# Patient Record
Sex: MALE | Race: WHITE | NOT HISPANIC OR LATINO | Employment: OTHER | URBAN - METROPOLITAN AREA
[De-identification: names, ages, dates, MRNs, and addresses within clinical notes are randomized per-mention and may not be internally consistent; named-entity substitution may affect disease eponyms.]

---

## 2023-03-23 PROBLEM — I10 HYPERTENSION: Status: ACTIVE | Noted: 2023-03-23

## 2023-03-23 PROBLEM — I49.3 PVC (PREMATURE VENTRICULAR CONTRACTION): Status: ACTIVE | Noted: 2023-03-23

## 2023-03-23 PROBLEM — E53.8 FOLIC ACID DEFICIENCY: Status: ACTIVE | Noted: 2023-03-23

## 2023-03-23 PROBLEM — I49.8 SINUS ARRHYTHMIA: Status: ACTIVE | Noted: 2023-03-23

## 2023-03-23 PROBLEM — R22.9 LUMPS ON THE SKIN: Status: ACTIVE | Noted: 2023-03-23

## 2023-03-23 PROBLEM — N32.81 OVERACTIVE BLADDER: Status: ACTIVE | Noted: 2023-03-23

## 2023-03-23 PROBLEM — H90.3 BILATERAL SENSORINEURAL HEARING LOSS: Status: ACTIVE | Noted: 2023-03-23

## 2023-03-23 PROBLEM — I45.2 BIFASCICULAR BLOCK: Status: ACTIVE | Noted: 2023-03-23

## 2023-03-23 PROBLEM — F03.90 DEMENTIA (MULTI): Status: ACTIVE | Noted: 2023-03-23

## 2023-03-23 PROBLEM — H93.13 BILATERAL TINNITUS: Status: ACTIVE | Noted: 2023-03-23

## 2023-03-23 PROBLEM — L57.0 ACTINIC KERATOSES: Status: ACTIVE | Noted: 2023-03-23

## 2023-03-23 PROBLEM — K40.90 LEFT INGUINAL HERNIA: Status: ACTIVE | Noted: 2023-03-23

## 2023-03-23 PROBLEM — K41.90 FEMORAL HERNIA: Status: ACTIVE | Noted: 2023-03-23

## 2023-03-23 PROBLEM — R35.0 FREQUENCY OF URINATION: Status: ACTIVE | Noted: 2023-03-23

## 2023-03-23 PROBLEM — H61.22 EXCESSIVE CERUMEN IN LEFT EAR CANAL: Status: ACTIVE | Noted: 2023-03-23

## 2023-03-23 PROBLEM — C44.222 SQUAMOUS CELL CARCINOMA OF SKIN OF RIGHT EAR: Status: ACTIVE | Noted: 2023-03-23

## 2023-03-23 PROBLEM — E78.5 HYPERLIPIDEMIA: Status: ACTIVE | Noted: 2023-03-23

## 2023-03-23 PROBLEM — B35.3 TINEA PEDIS: Status: ACTIVE | Noted: 2023-03-23

## 2023-03-23 PROBLEM — R09.89 PULMONARY HYPERINFLATION: Status: ACTIVE | Noted: 2023-03-23

## 2023-03-23 PROBLEM — K40.90 RIGHT INGUINAL HERNIA: Status: ACTIVE | Noted: 2023-03-23

## 2023-03-23 PROBLEM — K59.00 CONSTIPATION: Status: ACTIVE | Noted: 2023-03-23

## 2023-03-23 RX ORDER — OXYBUTYNIN CHLORIDE 5 MG/1
1 TABLET ORAL DAILY
COMMUNITY
Start: 2022-07-06 | End: 2023-04-14 | Stop reason: ALTCHOICE

## 2023-03-23 RX ORDER — LOVASTATIN 10 MG/1
1 TABLET ORAL NIGHTLY
COMMUNITY
Start: 2014-04-29 | End: 2023-07-19 | Stop reason: SDUPTHER

## 2023-03-23 RX ORDER — UBIDECARENONE 75 MG
1 CAPSULE ORAL DAILY
COMMUNITY
Start: 2022-04-29

## 2023-03-23 RX ORDER — MELATONIN 10 MG
TABLET, SUBLINGUAL SUBLINGUAL
COMMUNITY
End: 2023-04-14 | Stop reason: ALTCHOICE

## 2023-03-23 RX ORDER — METOPROLOL SUCCINATE 25 MG/1
1 TABLET, EXTENDED RELEASE ORAL DAILY
COMMUNITY
Start: 2017-10-03 | End: 2023-04-14 | Stop reason: SDUPTHER

## 2023-03-23 RX ORDER — AMLODIPINE BESYLATE 5 MG/1
TABLET ORAL
COMMUNITY
Start: 2022-01-03 | End: 2023-12-21 | Stop reason: SDUPTHER

## 2023-03-23 RX ORDER — AMOXICILLIN AND CLAVULANATE POTASSIUM 875; 125 MG/1; MG/1
1 TABLET, FILM COATED ORAL EVERY 12 HOURS
COMMUNITY
Start: 2022-08-25 | End: 2023-04-14 | Stop reason: ALTCHOICE

## 2023-04-14 ENCOUNTER — OFFICE VISIT (OUTPATIENT)
Dept: PRIMARY CARE | Facility: CLINIC | Age: 88
End: 2023-04-14
Payer: MEDICARE

## 2023-04-14 VITALS
OXYGEN SATURATION: 98 % | SYSTOLIC BLOOD PRESSURE: 130 MMHG | WEIGHT: 174 LBS | HEART RATE: 65 BPM | BODY MASS INDEX: 26.46 KG/M2 | DIASTOLIC BLOOD PRESSURE: 70 MMHG

## 2023-04-14 DIAGNOSIS — Z12.5 PROSTATE CANCER SCREENING: ICD-10-CM

## 2023-04-14 DIAGNOSIS — E78.5 HYPERLIPIDEMIA, UNSPECIFIED HYPERLIPIDEMIA TYPE: ICD-10-CM

## 2023-04-14 DIAGNOSIS — Z00.00 ROUTINE GENERAL MEDICAL EXAMINATION AT A HEALTH CARE FACILITY: Primary | ICD-10-CM

## 2023-04-14 DIAGNOSIS — E55.9 VITAMIN D DEFICIENCY: ICD-10-CM

## 2023-04-14 DIAGNOSIS — E53.8 FOLIC ACID DEFICIENCY: ICD-10-CM

## 2023-04-14 DIAGNOSIS — F03.90 DEMENTIA, UNSPECIFIED DEMENTIA SEVERITY, UNSPECIFIED DEMENTIA TYPE, UNSPECIFIED WHETHER BEHAVIORAL, PSYCHOTIC, OR MOOD DISTURBANCE OR ANXIETY (MULTI): ICD-10-CM

## 2023-04-14 DIAGNOSIS — I10 PRIMARY HYPERTENSION: ICD-10-CM

## 2023-04-14 DIAGNOSIS — I49.3 PVC (PREMATURE VENTRICULAR CONTRACTION): ICD-10-CM

## 2023-04-14 DIAGNOSIS — Z13.6 ENCOUNTER FOR SCREENING FOR CARDIOVASCULAR DISORDERS: ICD-10-CM

## 2023-04-14 DIAGNOSIS — N32.81 OVERACTIVE BLADDER: ICD-10-CM

## 2023-04-14 DIAGNOSIS — Z13.0 SCREENING FOR DEFICIENCY ANEMIA: ICD-10-CM

## 2023-04-14 PROBLEM — K59.00 CONSTIPATION: Status: RESOLVED | Noted: 2023-03-23 | Resolved: 2023-04-14

## 2023-04-14 PROBLEM — D72.829 LEUKOCYTOSIS: Status: ACTIVE | Noted: 2020-09-01

## 2023-04-14 PROBLEM — S72.141A INTERTROCHANTERIC FRACTURE OF RIGHT FEMUR, CLOSED, INITIAL ENCOUNTER (MULTI): Status: ACTIVE | Noted: 2020-09-01

## 2023-04-14 LAB
ALANINE AMINOTRANSFERASE (SGPT) (U/L) IN SER/PLAS: 12 U/L (ref 10–52)
ALBUMIN (G/DL) IN SER/PLAS: 4.5 G/DL (ref 3.4–5)
ALKALINE PHOSPHATASE (U/L) IN SER/PLAS: 60 U/L (ref 33–136)
ANION GAP IN SER/PLAS: 12 MMOL/L (ref 10–20)
ASPARTATE AMINOTRANSFERASE (SGOT) (U/L) IN SER/PLAS: 16 U/L (ref 9–39)
BILIRUBIN TOTAL (MG/DL) IN SER/PLAS: 0.8 MG/DL (ref 0–1.2)
CALCIUM (MG/DL) IN SER/PLAS: 9.3 MG/DL (ref 8.6–10.6)
CARBON DIOXIDE, TOTAL (MMOL/L) IN SER/PLAS: 31 MMOL/L (ref 21–32)
CHLORIDE (MMOL/L) IN SER/PLAS: 104 MMOL/L (ref 98–107)
CHOLESTEROL (MG/DL) IN SER/PLAS: 195 MG/DL (ref 0–199)
CHOLESTEROL IN HDL (MG/DL) IN SER/PLAS: 68 MG/DL
CHOLESTEROL/HDL RATIO: 2.9
CREATININE (MG/DL) IN SER/PLAS: 0.99 MG/DL (ref 0.5–1.3)
GFR MALE: 73 ML/MIN/1.73M2
GLUCOSE (MG/DL) IN SER/PLAS: 105 MG/DL (ref 74–99)
LDL: 107 MG/DL (ref 0–99)
POTASSIUM (MMOL/L) IN SER/PLAS: 3.8 MMOL/L (ref 3.5–5.3)
PROTEIN TOTAL: 6.8 G/DL (ref 6.4–8.2)
SODIUM (MMOL/L) IN SER/PLAS: 143 MMOL/L (ref 136–145)
TRIGLYCERIDE (MG/DL) IN SER/PLAS: 98 MG/DL (ref 0–149)
UREA NITROGEN (MG/DL) IN SER/PLAS: 19 MG/DL (ref 6–23)
VLDL: 20 MG/DL (ref 0–40)

## 2023-04-14 PROCEDURE — 1160F RVW MEDS BY RX/DR IN RCRD: CPT | Performed by: STUDENT IN AN ORGANIZED HEALTH CARE EDUCATION/TRAINING PROGRAM

## 2023-04-14 PROCEDURE — 82306 VITAMIN D 25 HYDROXY: CPT

## 2023-04-14 PROCEDURE — 84154 ASSAY OF PSA FREE: CPT

## 2023-04-14 PROCEDURE — 82607 VITAMIN B-12: CPT

## 2023-04-14 PROCEDURE — G0439 PPPS, SUBSEQ VISIT: HCPCS | Performed by: STUDENT IN AN ORGANIZED HEALTH CARE EDUCATION/TRAINING PROGRAM

## 2023-04-14 PROCEDURE — 1157F ADVNC CARE PLAN IN RCRD: CPT | Performed by: STUDENT IN AN ORGANIZED HEALTH CARE EDUCATION/TRAINING PROGRAM

## 2023-04-14 PROCEDURE — 1170F FXNL STATUS ASSESSED: CPT | Performed by: STUDENT IN AN ORGANIZED HEALTH CARE EDUCATION/TRAINING PROGRAM

## 2023-04-14 PROCEDURE — 84153 ASSAY OF PSA TOTAL: CPT

## 2023-04-14 PROCEDURE — 1159F MED LIST DOCD IN RCRD: CPT | Performed by: STUDENT IN AN ORGANIZED HEALTH CARE EDUCATION/TRAINING PROGRAM

## 2023-04-14 PROCEDURE — 3078F DIAST BP <80 MM HG: CPT | Performed by: STUDENT IN AN ORGANIZED HEALTH CARE EDUCATION/TRAINING PROGRAM

## 2023-04-14 PROCEDURE — 80053 COMPREHEN METABOLIC PANEL: CPT

## 2023-04-14 PROCEDURE — 85027 COMPLETE CBC AUTOMATED: CPT

## 2023-04-14 PROCEDURE — 3075F SYST BP GE 130 - 139MM HG: CPT | Performed by: STUDENT IN AN ORGANIZED HEALTH CARE EDUCATION/TRAINING PROGRAM

## 2023-04-14 PROCEDURE — 80061 LIPID PANEL: CPT

## 2023-04-14 RX ORDER — CHOLECALCIFEROL (VITAMIN D3) 25 MCG
2000 TABLET ORAL
COMMUNITY
Start: 2021-02-09

## 2023-04-14 RX ORDER — TAMSULOSIN HYDROCHLORIDE 0.4 MG/1
1 CAPSULE ORAL DAILY
COMMUNITY
Start: 2023-04-08

## 2023-04-14 RX ORDER — MELATONIN 3 MG
CAPSULE ORAL
COMMUNITY
End: 2023-04-14 | Stop reason: ALTCHOICE

## 2023-04-14 RX ORDER — METOPROLOL SUCCINATE 25 MG/1
25 TABLET, EXTENDED RELEASE ORAL
COMMUNITY
Start: 2021-11-26 | End: 2023-04-14 | Stop reason: SDUPTHER

## 2023-04-14 RX ORDER — METOPROLOL SUCCINATE 25 MG/1
25 TABLET, EXTENDED RELEASE ORAL DAILY
Qty: 90 TABLET | Refills: 3 | Status: SHIPPED | OUTPATIENT
Start: 2023-04-14 | End: 2023-05-19 | Stop reason: SDUPTHER

## 2023-04-14 RX ORDER — NEOMYCIN SULFATE, POLYMYXIN B SULFATE AND HYDROCORTISONE 10; 3.5; 1 MG/ML; MG/ML; [USP'U]/ML
SUSPENSION/ DROPS AURICULAR (OTIC)
COMMUNITY
Start: 2022-12-10 | End: 2024-04-15 | Stop reason: ALTCHOICE

## 2023-04-14 RX ORDER — AMOXICILLIN 875 MG/1
TABLET, FILM COATED ORAL
COMMUNITY
Start: 2022-11-07 | End: 2023-04-14 | Stop reason: ALTCHOICE

## 2023-04-14 RX ORDER — PREDNISONE 10 MG/1
TABLET ORAL
COMMUNITY
Start: 2022-12-19 | End: 2023-04-14 | Stop reason: ALTCHOICE

## 2023-04-14 RX ORDER — ASPIRIN 81 MG/1
TABLET ORAL
COMMUNITY
End: 2023-04-14 | Stop reason: ALTCHOICE

## 2023-04-14 RX ORDER — COVID-19 MOLECULAR TEST ASSAY
KIT MISCELLANEOUS
COMMUNITY
Start: 2023-02-07 | End: 2023-04-14 | Stop reason: ALTCHOICE

## 2023-04-14 ASSESSMENT — PATIENT HEALTH QUESTIONNAIRE - PHQ9
2. FEELING DOWN, DEPRESSED OR HOPELESS: NOT AT ALL
SUM OF ALL RESPONSES TO PHQ9 QUESTIONS 1 AND 2: 0
1. LITTLE INTEREST OR PLEASURE IN DOING THINGS: NOT AT ALL

## 2023-04-14 ASSESSMENT — ACTIVITIES OF DAILY LIVING (ADL)
GROCERY_SHOPPING: INDEPENDENT
TAKING_MEDICATION: INDEPENDENT
DRESSING: INDEPENDENT
MANAGING_FINANCES: INDEPENDENT
DOING_HOUSEWORK: INDEPENDENT
BATHING: INDEPENDENT

## 2023-04-14 ASSESSMENT — ENCOUNTER SYMPTOMS
LOSS OF SENSATION IN FEET: 0
OCCASIONAL FEELINGS OF UNSTEADINESS: 0
DEPRESSION: 0

## 2023-04-14 NOTE — PATIENT INSTRUCTIONS
#1.  Medicare wellness visit.  No concerns on exam.  Screening labs ordered today.  Up-to-date with vaccinations.  Continue healthy diet, exercise habits.  We will add a men's multivitamin in addition to your vitamin D and B12 supplements.    2.  History of hypertension hyperlipidemia.  Continue current meds call if refills needed.    3.  History of BPH.  Continue Flomax.

## 2023-04-14 NOTE — PROGRESS NOTES
Subjective   Patient ID: Topher Burroughs is a 88 y.o. male who presents for Medicare Annual Wellness Visit Subsequent (He is fasting.) and Med Refill.    HPI comes in for Medicare wellness    Review of Systems  Constitutional: NO F, chills, or sweats  Eyes: no blurred vision or visual disturbance  ENT: no hearing loss, no congestion, no nasal discharge, no hoarseness and no sore throat.   Cardiovascular: no chest pain, no edema, no palps and no syncope.   Respiratory: no cough,no s.o.b. and no wheezing  Gastrointestinal: no abdominal pain, No C/D no N/V, no blood in stools  Genitourinary: no dysuria, no change in urinary frequency, no urinary hesitancy and no feelings of urinary urgency.   Musculoskeletal: no arthralgias,  no back pain and no myalgias.   Integumentary: no new skin lesions and no rashes.   Neurological: no difficulty walking, no headache, no limb weakness, no numbness and no tingling.   Psychiatric: no anxiety, no depression, no anhedonia and no substance use disorders.   Endocrine: no recent weight gain and no recent weight loss.   Hematologic/Lymphatic: no tendency for easy bruising and no swollen glands.  Objective   /70 (BP Location: Right arm, Patient Position: Sitting, BP Cuff Size: Adult)   Pulse 65   Wt 78.9 kg (174 lb)   SpO2 98%   BMI 26.46 kg/m²     Physical Exam  gen- a & o x 3, nad, pleasant  heent- eomi, perrla, ear canals patent, TM's non-erythematous, no fluid, frontal and maxillary sinus's nontender  neck- supple, nontender, no palpable or enlarged nodes, no thyromegaly  heart- rrr, no murmurs  lungs- cta b/l , no w/r/r  chest- symmetric, nontender  ab- soft, nontender, no palpable organomegaly, postive bowel sounds  ex's- no c/c/e  neuro- CNs 2-12 grossly intact, full sensation and strength in all extremities    Assessment/Plan     #1.  Medicare wellness visit.  No concerns on exam.  Screening labs ordered today.  Up-to-date with vaccinations.  Continue healthy diet,  exercise habits.  We will add a men's multivitamin in addition to your vitamin D and B12 supplements.    2.  History of hypertension hyperlipidemia.  Continue current meds call if refills needed.    3.  History of BPH.  Continue Flomax.

## 2023-04-15 LAB
CALCIDIOL (25 OH VITAMIN D3) (NG/ML) IN SER/PLAS: 34 NG/ML
COBALAMIN (VITAMIN B12) (PG/ML) IN SER/PLAS: 395 PG/ML (ref 211–911)
ERYTHROCYTE DISTRIBUTION WIDTH (RATIO) BY AUTOMATED COUNT: 14.2 % (ref 11.5–14.5)
ERYTHROCYTE MEAN CORPUSCULAR HEMOGLOBIN CONCENTRATION (G/DL) BY AUTOMATED: 30.3 G/DL (ref 32–36)
ERYTHROCYTE MEAN CORPUSCULAR VOLUME (FL) BY AUTOMATED COUNT: 105 FL (ref 80–100)
ERYTHROCYTES (10*6/UL) IN BLOOD BY AUTOMATED COUNT: 4.13 X10E12/L (ref 4.5–5.9)
HEMATOCRIT (%) IN BLOOD BY AUTOMATED COUNT: 43.5 % (ref 41–52)
HEMOGLOBIN (G/DL) IN BLOOD: 13.2 G/DL (ref 13.5–17.5)
LEUKOCYTES (10*3/UL) IN BLOOD BY AUTOMATED COUNT: 7.3 X10E9/L (ref 4.4–11.3)
NRBC (PER 100 WBCS) BY AUTOMATED COUNT: 0 /100 WBC (ref 0–0)
PLATELETS (10*3/UL) IN BLOOD AUTOMATED COUNT: ABNORMAL X10E9/L (ref 150–450)

## 2023-04-18 LAB
PROSTATE SPECIFIC AG (NG/ML) IN SER/PLAS: 0.3 NG/ML (ref 0–4)
PROSTATE SPECIFIC AG FREE (NG/ML) IN SER/PLAS: <0.1 NG/ML
PROSTATE SPECIFIC AG FREE/PROSTATE SPECIFIC AG TOTAL IN SER/PLAS: <33 %

## 2023-05-19 DIAGNOSIS — I10 PRIMARY HYPERTENSION: ICD-10-CM

## 2023-05-19 RX ORDER — METOPROLOL SUCCINATE 25 MG/1
25 TABLET, EXTENDED RELEASE ORAL DAILY
Qty: 90 TABLET | Refills: 3 | Status: SHIPPED | OUTPATIENT
Start: 2023-05-19 | End: 2024-04-15 | Stop reason: SDUPTHER

## 2023-07-19 DIAGNOSIS — E78.5 HYPERLIPIDEMIA, UNSPECIFIED HYPERLIPIDEMIA TYPE: Primary | ICD-10-CM

## 2023-07-19 RX ORDER — LOVASTATIN 10 MG/1
10 TABLET ORAL NIGHTLY
Qty: 90 TABLET | Refills: 3 | Status: SHIPPED | OUTPATIENT
Start: 2023-07-19 | End: 2024-04-15 | Stop reason: ALTCHOICE

## 2023-09-19 LAB — PROSTATE SPECIFIC AG (NG/ML) IN SER/PLAS: 0.21 NG/ML (ref 0–4)

## 2023-12-21 ENCOUNTER — TELEPHONE (OUTPATIENT)
Dept: PRIMARY CARE | Facility: CLINIC | Age: 88
End: 2023-12-21
Payer: MEDICARE

## 2023-12-21 DIAGNOSIS — I10 PRIMARY HYPERTENSION: Primary | ICD-10-CM

## 2023-12-21 RX ORDER — AMLODIPINE BESYLATE 5 MG/1
5 TABLET ORAL DAILY
Qty: 90 TABLET | Refills: 3 | Status: SHIPPED | OUTPATIENT
Start: 2023-12-21 | End: 2024-12-20

## 2023-12-21 NOTE — TELEPHONE ENCOUNTER
Pt had annual in April but needed refill on amlodipine 5 mg once daily to  [facility] [phone]  No allergies.  Ty

## 2023-12-22 ENCOUNTER — TELEPHONE (OUTPATIENT)
Dept: PRIMARY CARE | Facility: CLINIC | Age: 88
End: 2023-12-22
Payer: MEDICARE

## 2023-12-22 NOTE — TELEPHONE ENCOUNTER
Pt has ear that feels blocked.  Coming in next week.  Anything to do in the meantime?  Please advise  [phone]  No allergies

## 2023-12-27 ENCOUNTER — OFFICE VISIT (OUTPATIENT)
Dept: PRIMARY CARE | Facility: CLINIC | Age: 88
End: 2023-12-27
Payer: MEDICARE

## 2023-12-27 ENCOUNTER — APPOINTMENT (OUTPATIENT)
Dept: PRIMARY CARE | Facility: CLINIC | Age: 88
End: 2023-12-27
Payer: MEDICARE

## 2023-12-27 VITALS — DIASTOLIC BLOOD PRESSURE: 84 MMHG | SYSTOLIC BLOOD PRESSURE: 122 MMHG | BODY MASS INDEX: 25.85 KG/M2 | WEIGHT: 170 LBS

## 2023-12-27 DIAGNOSIS — L30.9 ECZEMA, UNSPECIFIED TYPE: Primary | ICD-10-CM

## 2023-12-27 PROCEDURE — 3079F DIAST BP 80-89 MM HG: CPT | Performed by: STUDENT IN AN ORGANIZED HEALTH CARE EDUCATION/TRAINING PROGRAM

## 2023-12-27 PROCEDURE — 1160F RVW MEDS BY RX/DR IN RCRD: CPT | Performed by: STUDENT IN AN ORGANIZED HEALTH CARE EDUCATION/TRAINING PROGRAM

## 2023-12-27 PROCEDURE — 1126F AMNT PAIN NOTED NONE PRSNT: CPT | Performed by: STUDENT IN AN ORGANIZED HEALTH CARE EDUCATION/TRAINING PROGRAM

## 2023-12-27 PROCEDURE — 1159F MED LIST DOCD IN RCRD: CPT | Performed by: STUDENT IN AN ORGANIZED HEALTH CARE EDUCATION/TRAINING PROGRAM

## 2023-12-27 PROCEDURE — 1036F TOBACCO NON-USER: CPT | Performed by: STUDENT IN AN ORGANIZED HEALTH CARE EDUCATION/TRAINING PROGRAM

## 2023-12-27 PROCEDURE — 3074F SYST BP LT 130 MM HG: CPT | Performed by: STUDENT IN AN ORGANIZED HEALTH CARE EDUCATION/TRAINING PROGRAM

## 2023-12-27 PROCEDURE — 99213 OFFICE O/P EST LOW 20 MIN: CPT | Performed by: STUDENT IN AN ORGANIZED HEALTH CARE EDUCATION/TRAINING PROGRAM

## 2023-12-27 NOTE — PATIENT INSTRUCTIONS
1.  He has some mild eczema of the left ear pinna.  Otherwise there are no concerns on exam.  Would advise on a moisturizer cream such as Aquaphor or Eucerin.  If you develop any itching can use over-the-counter 1% hydrocortisone cream.

## 2023-12-27 NOTE — PROGRESS NOTES
Subjective   Patient ID: Topher Burroughs is a 89 y.o. male who presents for Earache (High pitch noise in the ear.).    HPI comes in with some irritation of his left ear    Review of Systems  Constitutional: NO F, chills, or sweats  Eyes: no blurred vision or visual disturbance  ENT: no hearing loss, no congestion, no nasal discharge, no hoarseness and no sore throat.   Cardiovascular: no chest pain, no edema, no palps and no syncope.   Respiratory: no cough,no s.o.b. and no wheezing  Gastrointestinal: no abdominal pain, No C/D no N/V, no blood in stools  Genitourinary: no dysuria, no change in urinary frequency, no urinary hesitancy and no feelings of urinary urgency.   Musculoskeletal: no arthralgias,  no back pain and no myalgias.   Integumentary: Left outer irritation  Objective   /84   Wt 77.1 kg (170 lb)   BMI 25.85 kg/m²     Physical Exam  Derm-eczema of the left pinna  Assessment/Plan     1.  He has some mild eczema of the left ear pinna.  Otherwise there are no concerns on exam.  Would advise on a moisturizer cream such as Aquaphor or Eucerin.  If you develop any itching can use over-the-counter 1% hydrocortisone cream.

## 2024-01-23 ENCOUNTER — TELEPHONE (OUTPATIENT)
Dept: PRIMARY CARE | Facility: CLINIC | Age: 89
End: 2024-01-23
Payer: MEDICARE

## 2024-01-23 NOTE — TELEPHONE ENCOUNTER
"Topher called and stated he thinks his \"intestines are coming out of his butt\" he mentioned \"rectal prolapse\"  He stated it is not affecting him and it doesn't hurt but it is just there    Wants to know if you think he needs to make an office visit with you or what do you advise   "

## 2024-01-24 DIAGNOSIS — R19.5 ABNORMAL STOOLS: Primary | ICD-10-CM

## 2024-01-25 ENCOUNTER — OFFICE VISIT (OUTPATIENT)
Dept: SURGERY | Facility: CLINIC | Age: 89
End: 2024-01-25
Payer: MEDICARE

## 2024-01-25 VITALS
BODY MASS INDEX: 25.82 KG/M2 | RESPIRATION RATE: 18 BRPM | OXYGEN SATURATION: 98 % | TEMPERATURE: 98.2 F | SYSTOLIC BLOOD PRESSURE: 134 MMHG | DIASTOLIC BLOOD PRESSURE: 84 MMHG | HEART RATE: 106 BPM | WEIGHT: 170.4 LBS | HEIGHT: 68 IN

## 2024-01-25 DIAGNOSIS — K64.2 GRADE III HEMORRHOIDS: Primary | ICD-10-CM

## 2024-01-25 PROBLEM — K64.9 HEMORRHOIDS: Status: ACTIVE | Noted: 2024-01-25

## 2024-01-25 PROCEDURE — 1157F ADVNC CARE PLAN IN RCRD: CPT | Performed by: SURGERY

## 2024-01-25 PROCEDURE — 99212 OFFICE O/P EST SF 10 MIN: CPT | Performed by: SURGERY

## 2024-01-25 PROCEDURE — 1126F AMNT PAIN NOTED NONE PRSNT: CPT | Performed by: SURGERY

## 2024-01-25 PROCEDURE — 1159F MED LIST DOCD IN RCRD: CPT | Performed by: SURGERY

## 2024-01-25 PROCEDURE — 1036F TOBACCO NON-USER: CPT | Performed by: SURGERY

## 2024-01-25 PROCEDURE — 3079F DIAST BP 80-89 MM HG: CPT | Performed by: SURGERY

## 2024-01-25 PROCEDURE — 3075F SYST BP GE 130 - 139MM HG: CPT | Performed by: SURGERY

## 2024-01-25 RX ORDER — KETOCONAZOLE 20 MG/G
CREAM TOPICAL
COMMUNITY
Start: 2023-10-17 | End: 2024-04-15 | Stop reason: ALTCHOICE

## 2024-01-25 RX ORDER — MUPIROCIN 20 MG/G
OINTMENT TOPICAL
COMMUNITY
Start: 2023-07-12 | End: 2024-04-15 | Stop reason: ALTCHOICE

## 2024-01-25 ASSESSMENT — ENCOUNTER SYMPTOMS
CARDIOVASCULAR NEGATIVE: 1
ENDOCRINE NEGATIVE: 1
CONSTITUTIONAL NEGATIVE: 1
RESPIRATORY NEGATIVE: 1
EYES NEGATIVE: 1
GASTROINTESTINAL NEGATIVE: 1

## 2024-01-25 ASSESSMENT — PAIN SCALES - GENERAL: PAINLEVEL: 0-NO PAIN

## 2024-01-25 NOTE — ASSESSMENT & PLAN NOTE
Care for the resolution and prevention of Internal Hemorrhoids:    a. Avoid constipation and straining with bowel movements. Colace and senna can assist if stool is hard. Limit toilet time (reading, phones, etc).  b. Stay well-hydrated. Drink at least six 8 ounce glasses of fluid daily.  c. High fiber diet. Consider supplementation with Metamucil.  d. If Anusol or Proctosol ointment, cream, or suppositories have been prescribed, you may call the office for a refill if needed. Otherwise make appointment in 6 weeks to check in on progress.

## 2024-01-25 NOTE — PROGRESS NOTES
Subjective   Patient ID: Topher Burroughs is a 89 y.o. male who presents for Hemorrhoids.  HPI  88 YO M BMI 26 with BPH, HTN, HLD, now with Grade III hemorrhoids for a few weeks. Nonbleeding. No soiling. No irritation. Pushes them back in.    Review of Systems   Constitutional: Negative.    HENT: Negative.     Eyes: Negative.    Respiratory: Negative.     Cardiovascular: Negative.    Gastrointestinal: Negative.    Endocrine: Negative.    Genitourinary: Negative.        Objective   Physical Exam  Constitutional:       Appearance: Normal appearance.   HENT:      Head: Atraumatic.      Nose: Nose normal.      Mouth/Throat:      Mouth: Mucous membranes are moist.   Cardiovascular:      Rate and Rhythm: Normal rate and regular rhythm.   Pulmonary:      Effort: Pulmonary effort is normal.      Breath sounds: Normal breath sounds.   Abdominal:      General: There is no distension.      Palpations: Abdomen is soft.      Tenderness: There is no guarding or rebound.   Genitourinary:     Comments: Three columns of grade II-III with some bruising, no inflammation, no bleeding.  Skin:     General: Skin is warm.   Neurological:      Mental Status: He is alert. Mental status is at baseline.   Psychiatric:         Mood and Affect: Mood normal.         Thought Content: Thought content normal.         Judgment: Judgment normal.         Assessment/Plan   Problem List Items Addressed This Visit             ICD-10-CM       Gastrointestinal and Abdominal    Hemorrhoids - Primary K64.9     Care for the resolution and prevention of Internal Hemorrhoids:    a. Avoid constipation and straining with bowel movements. Colace and senna can assist if stool is hard. Limit toilet time (reading, phones, etc).  b. Stay well-hydrated. Drink at least six 8 ounce glasses of fluid daily.  c. High fiber diet. Consider supplementation with Metamucil.  d. If Anusol or Proctosol ointment, cream, or suppositories have been prescribed, you may call the office  for a refill if needed. Otherwise make appointment in 6 weeks to check in on progress.                 Johny Huffman MD PhD 01/25/24 4:41 PM

## 2024-03-05 PROBLEM — Z85.46 HISTORY OF MALIGNANT NEOPLASM OF PROSTATE: Status: ACTIVE | Noted: 2024-03-05

## 2024-03-05 PROBLEM — H61.22 IMPACTED CERUMEN, LEFT EAR: Status: ACTIVE | Noted: 2022-06-09

## 2024-03-05 PROBLEM — C61 CARCINOMA OF PROSTATE (MULTI): Status: ACTIVE | Noted: 2024-03-05

## 2024-03-05 PROBLEM — R07.0 PAIN IN THROAT: Status: ACTIVE | Noted: 2024-03-05

## 2024-03-05 PROBLEM — L30.9 ECZEMA: Status: ACTIVE | Noted: 2024-03-05

## 2024-03-05 PROBLEM — Z86.79 HISTORY OF HYPERTENSION: Status: RESOLVED | Noted: 2024-03-05 | Resolved: 2024-03-05

## 2024-03-05 PROBLEM — M19.019 ARTHROPATHY OF SHOULDER REGION: Status: ACTIVE | Noted: 2024-03-05

## 2024-03-05 PROBLEM — H93.8X2 SENSATION OF PLUGGED EAR ON LEFT SIDE: Status: ACTIVE | Noted: 2024-03-05

## 2024-03-05 PROBLEM — R51.9 TEMPORAL HEADACHE: Status: ACTIVE | Noted: 2024-03-05

## 2024-03-05 PROBLEM — H91.93 BILATERAL HEARING LOSS: Status: ACTIVE | Noted: 2024-03-05

## 2024-03-05 RX ORDER — OXYBUTYNIN CHLORIDE 5 MG/1
TABLET ORAL
COMMUNITY
Start: 2022-07-06 | End: 2024-04-15 | Stop reason: ALTCHOICE

## 2024-03-05 RX ORDER — OFLOXACIN 3 MG/ML
SOLUTION/ DROPS OPHTHALMIC
COMMUNITY
Start: 2021-05-06 | End: 2024-04-15 | Stop reason: ALTCHOICE

## 2024-03-05 RX ORDER — PREDNISONE 10 MG/1
TABLET ORAL
COMMUNITY
Start: 2022-12-19 | End: 2024-04-15 | Stop reason: ALTCHOICE

## 2024-03-05 RX ORDER — PREDNISOLONE ACETATE 10 MG/ML
SUSPENSION/ DROPS OPHTHALMIC
COMMUNITY
Start: 2021-05-06 | End: 2024-04-15 | Stop reason: ALTCHOICE

## 2024-03-05 RX ORDER — KETOROLAC TROMETHAMINE 5 MG/ML
SOLUTION OPHTHALMIC
COMMUNITY
Start: 2021-05-06 | End: 2024-04-15 | Stop reason: ALTCHOICE

## 2024-03-05 RX ORDER — CYANOCOBALAMIN (VITAMIN B-12) 500 MCG
TABLET ORAL
COMMUNITY
Start: 2022-03-09 | End: 2024-04-15 | Stop reason: ALTCHOICE

## 2024-03-05 RX ORDER — CLOTRIMAZOLE AND BETAMETHASONE DIPROPIONATE 10; .64 MG/G; MG/G
CREAM TOPICAL EVERY 12 HOURS
COMMUNITY
Start: 2022-04-11 | End: 2024-04-15 | Stop reason: ALTCHOICE

## 2024-03-07 ENCOUNTER — OFFICE VISIT (OUTPATIENT)
Dept: SURGERY | Facility: CLINIC | Age: 89
End: 2024-03-07
Payer: MEDICARE

## 2024-03-07 VITALS
RESPIRATION RATE: 17 BRPM | BODY MASS INDEX: 25.98 KG/M2 | TEMPERATURE: 97.5 F | HEART RATE: 65 BPM | HEIGHT: 68 IN | DIASTOLIC BLOOD PRESSURE: 69 MMHG | OXYGEN SATURATION: 96 % | SYSTOLIC BLOOD PRESSURE: 147 MMHG | WEIGHT: 171.4 LBS

## 2024-03-07 DIAGNOSIS — K64.9 HEMORRHOIDS, UNSPECIFIED HEMORRHOID TYPE: ICD-10-CM

## 2024-03-07 PROCEDURE — 1157F ADVNC CARE PLAN IN RCRD: CPT | Performed by: SURGERY

## 2024-03-07 PROCEDURE — 3077F SYST BP >= 140 MM HG: CPT | Performed by: SURGERY

## 2024-03-07 PROCEDURE — 99212 OFFICE O/P EST SF 10 MIN: CPT | Performed by: SURGERY

## 2024-03-07 PROCEDURE — 1126F AMNT PAIN NOTED NONE PRSNT: CPT | Performed by: SURGERY

## 2024-03-07 PROCEDURE — 3078F DIAST BP <80 MM HG: CPT | Performed by: SURGERY

## 2024-03-07 PROCEDURE — 1159F MED LIST DOCD IN RCRD: CPT | Performed by: SURGERY

## 2024-03-07 PROCEDURE — 1036F TOBACCO NON-USER: CPT | Performed by: SURGERY

## 2024-03-07 ASSESSMENT — PAIN SCALES - GENERAL: PAINLEVEL: 0-NO PAIN

## 2024-03-07 ASSESSMENT — ENCOUNTER SYMPTOMS
CARDIOVASCULAR NEGATIVE: 1
GASTROINTESTINAL NEGATIVE: 1
ENDOCRINE NEGATIVE: 1
RESPIRATORY NEGATIVE: 1
EYES NEGATIVE: 1
CONSTITUTIONAL NEGATIVE: 1

## 2024-03-07 NOTE — PROGRESS NOTES
Subjective   Patient ID: Topher Burroughs is a 89 y.o. male who presents for Hemorrhoids.  HPI  89 YO M BMI 26 with BPH, HTN, HLD, now with Grade III hemorrhoids for a few weeks. Nonbleeding. No soiling. No irritation. Pushes them back in.    Returned to clinic 03/07/2024. Hemorrhoids remain but minimal bruising, no irritation or bleeding. Will reduce fiber to avoid straining.    Review of Systems   Constitutional: Negative.    HENT: Negative.     Eyes: Negative.    Respiratory: Negative.     Cardiovascular: Negative.    Gastrointestinal: Negative.    Endocrine: Negative.    Genitourinary: Negative.        Objective   Physical Exam  Constitutional:       Appearance: Normal appearance.   HENT:      Head: Atraumatic.      Nose: Nose normal.      Mouth/Throat:      Mouth: Mucous membranes are moist.   Cardiovascular:      Rate and Rhythm: Normal rate and regular rhythm.   Pulmonary:      Effort: Pulmonary effort is normal.      Breath sounds: Normal breath sounds.   Abdominal:      General: There is no distension.      Palpations: Abdomen is soft.      Tenderness: There is no guarding or rebound.   Genitourinary:     Comments: Three columns of grade II-III with some bruising, no inflammation, no bleeding.  Skin:     General: Skin is warm.   Neurological:      Mental Status: He is alert. Mental status is at baseline.   Psychiatric:         Mood and Affect: Mood normal.         Thought Content: Thought content normal.         Judgment: Judgment normal.         Assessment/Plan   Problem List Items Addressed This Visit             ICD-10-CM       Gastrointestinal and Abdominal    Hemorrhoids K64.9   Return as needed to clinic. No intervention for now.         Johny Huffman MD PhD 03/07/24 5:06 PM

## 2024-03-14 ENCOUNTER — TELEPHONE (OUTPATIENT)
Dept: SURGERY | Facility: CLINIC | Age: 89
End: 2024-03-14
Payer: MEDICARE

## 2024-03-14 DIAGNOSIS — K64.9 HEMORRHOIDS, UNSPECIFIED HEMORRHOID TYPE: Primary | ICD-10-CM

## 2024-03-14 RX ORDER — HYDROCORTISONE 25 MG/G
CREAM TOPICAL 2 TIMES DAILY
Qty: 28 G | Refills: 0 | Status: SHIPPED | OUTPATIENT
Start: 2024-03-14 | End: 2024-04-15

## 2024-03-14 NOTE — TELEPHONE ENCOUNTER
Received a call that patient is using Preparation H which doesn't appear to be helping his hemorrhoids.  He denied any bleeding but wanted to know what else could be done.  I informed patient I would message Dr. Huffman to see what he advises.  Per Dr. Huffman he would like the patient back in the office to discuss banding.  I left a detailed message with patient and my direct phone number for return call to schedule this office visit.

## 2024-04-15 ENCOUNTER — OFFICE VISIT (OUTPATIENT)
Dept: PRIMARY CARE | Facility: CLINIC | Age: 89
End: 2024-04-15
Payer: MEDICARE

## 2024-04-15 VITALS
HEART RATE: 61 BPM | OXYGEN SATURATION: 94 % | WEIGHT: 169.38 LBS | DIASTOLIC BLOOD PRESSURE: 72 MMHG | SYSTOLIC BLOOD PRESSURE: 136 MMHG | BODY MASS INDEX: 25.75 KG/M2

## 2024-04-15 DIAGNOSIS — I10 PRIMARY HYPERTENSION: ICD-10-CM

## 2024-04-15 DIAGNOSIS — Z13.6 ENCOUNTER FOR SCREENING FOR CARDIOVASCULAR DISORDERS: ICD-10-CM

## 2024-04-15 DIAGNOSIS — Z00.00 WELLNESS EXAMINATION: Primary | ICD-10-CM

## 2024-04-15 DIAGNOSIS — Z13.0 SCREENING FOR DEFICIENCY ANEMIA: ICD-10-CM

## 2024-04-15 DIAGNOSIS — E55.9 VITAMIN D DEFICIENCY: ICD-10-CM

## 2024-04-15 DIAGNOSIS — Z13.29 SCREENING FOR THYROID DISORDER: ICD-10-CM

## 2024-04-15 DIAGNOSIS — Z12.5 PROSTATE CANCER SCREENING: ICD-10-CM

## 2024-04-15 DIAGNOSIS — F03.90 DEMENTIA, UNSPECIFIED DEMENTIA SEVERITY, UNSPECIFIED DEMENTIA TYPE, UNSPECIFIED WHETHER BEHAVIORAL, PSYCHOTIC, OR MOOD DISTURBANCE OR ANXIETY (MULTI): ICD-10-CM

## 2024-04-15 DIAGNOSIS — E78.5 HYPERLIPIDEMIA, UNSPECIFIED HYPERLIPIDEMIA TYPE: ICD-10-CM

## 2024-04-15 DIAGNOSIS — L30.9 ECZEMA, UNSPECIFIED TYPE: ICD-10-CM

## 2024-04-15 PROCEDURE — 80053 COMPREHEN METABOLIC PANEL: CPT

## 2024-04-15 PROCEDURE — 36415 COLL VENOUS BLD VENIPUNCTURE: CPT

## 2024-04-15 PROCEDURE — 84443 ASSAY THYROID STIM HORMONE: CPT

## 2024-04-15 PROCEDURE — 1036F TOBACCO NON-USER: CPT | Performed by: STUDENT IN AN ORGANIZED HEALTH CARE EDUCATION/TRAINING PROGRAM

## 2024-04-15 PROCEDURE — 82607 VITAMIN B-12: CPT

## 2024-04-15 PROCEDURE — 3078F DIAST BP <80 MM HG: CPT | Performed by: STUDENT IN AN ORGANIZED HEALTH CARE EDUCATION/TRAINING PROGRAM

## 2024-04-15 PROCEDURE — 1170F FXNL STATUS ASSESSED: CPT | Performed by: STUDENT IN AN ORGANIZED HEALTH CARE EDUCATION/TRAINING PROGRAM

## 2024-04-15 PROCEDURE — 1159F MED LIST DOCD IN RCRD: CPT | Performed by: STUDENT IN AN ORGANIZED HEALTH CARE EDUCATION/TRAINING PROGRAM

## 2024-04-15 PROCEDURE — 84154 ASSAY OF PSA FREE: CPT

## 2024-04-15 PROCEDURE — 82306 VITAMIN D 25 HYDROXY: CPT

## 2024-04-15 PROCEDURE — G0439 PPPS, SUBSEQ VISIT: HCPCS | Performed by: STUDENT IN AN ORGANIZED HEALTH CARE EDUCATION/TRAINING PROGRAM

## 2024-04-15 PROCEDURE — 3075F SYST BP GE 130 - 139MM HG: CPT | Performed by: STUDENT IN AN ORGANIZED HEALTH CARE EDUCATION/TRAINING PROGRAM

## 2024-04-15 PROCEDURE — G0103 PSA SCREENING: HCPCS

## 2024-04-15 PROCEDURE — 99214 OFFICE O/P EST MOD 30 MIN: CPT | Performed by: STUDENT IN AN ORGANIZED HEALTH CARE EDUCATION/TRAINING PROGRAM

## 2024-04-15 PROCEDURE — 1126F AMNT PAIN NOTED NONE PRSNT: CPT | Performed by: STUDENT IN AN ORGANIZED HEALTH CARE EDUCATION/TRAINING PROGRAM

## 2024-04-15 PROCEDURE — 1160F RVW MEDS BY RX/DR IN RCRD: CPT | Performed by: STUDENT IN AN ORGANIZED HEALTH CARE EDUCATION/TRAINING PROGRAM

## 2024-04-15 PROCEDURE — 1157F ADVNC CARE PLAN IN RCRD: CPT | Performed by: STUDENT IN AN ORGANIZED HEALTH CARE EDUCATION/TRAINING PROGRAM

## 2024-04-15 PROCEDURE — 80061 LIPID PANEL: CPT

## 2024-04-15 PROCEDURE — 85025 COMPLETE CBC W/AUTO DIFF WBC: CPT

## 2024-04-15 RX ORDER — METOPROLOL SUCCINATE 25 MG/1
25 TABLET, EXTENDED RELEASE ORAL DAILY
Qty: 90 TABLET | Refills: 3 | Status: SHIPPED | OUTPATIENT
Start: 2024-04-15 | End: 2025-04-15

## 2024-04-15 RX ORDER — BISMUTH SUBSALICYLATE 262 MG
1 TABLET,CHEWABLE ORAL DAILY
COMMUNITY

## 2024-04-15 ASSESSMENT — PATIENT HEALTH QUESTIONNAIRE - PHQ9
2. FEELING DOWN, DEPRESSED OR HOPELESS: NOT AT ALL
1. LITTLE INTEREST OR PLEASURE IN DOING THINGS: NOT AT ALL
SUM OF ALL RESPONSES TO PHQ9 QUESTIONS 1 AND 2: 0

## 2024-04-15 ASSESSMENT — ENCOUNTER SYMPTOMS
DEPRESSION: 0
OCCASIONAL FEELINGS OF UNSTEADINESS: 0
LOSS OF SENSATION IN FEET: 0

## 2024-04-15 ASSESSMENT — ACTIVITIES OF DAILY LIVING (ADL)
BATHING: INDEPENDENT
MANAGING_FINANCES: INDEPENDENT
DOING_HOUSEWORK: INDEPENDENT
TAKING_MEDICATION: INDEPENDENT
GROCERY_SHOPPING: INDEPENDENT
DRESSING: INDEPENDENT

## 2024-04-15 ASSESSMENT — PAIN SCALES - GENERAL: PAINLEVEL: 0-NO PAIN

## 2024-04-15 NOTE — PATIENT INSTRUCTIONS
#1.  Medicare wellness visit.  No concerns on exam.  Screening labs ordered today.  Up-to-date with vaccinations.  Continue healthy diet, exercise habits.  We will add a men's multivitamin in addition to your vitamin D and B12 supplements.     2.  History of hypertension hyperlipidemia.  Med refill for metoprolol.  We will discontinue lovastatin     3.  History of BPH.  Continue Flomax.    #4.  He has had some issues with hemorrhoids.  Discussed daily fiber such as Metamucil or fiber supplements such as Gummies.  Would advise on about 15 to 20 g of fiber supplement each morning.  Try your best to get good fiber in your diet as well and optimize your hydration.  Try to normalize your bowel movements with regular fiber supplementation and avoid all straining and constipation.    5.  Mild memory issues.  However would advise on continuing.  Good habits.  Regular exercise.  Lots of reading.  And try to get out of the house and socialize

## 2024-04-15 NOTE — PROGRESS NOTES
Subjective   Patient ID: Topher Burroughs is a 89 y.o. male who presents for Medicare Annual Wellness Visit Subsequent (He is fasting.), Memory Loss, and Med Refill.    HPI comes in for Medicare wellness.  His biggest issue past several months has been some occasional hemorrhoid issues.  Otherwise no major changes    Review of Systems  Constitutional: NO F, chills, or sweats  Eyes: no blurred vision or visual disturbance  ENT: no hearing loss, no congestion, no nasal discharge, no hoarseness and no sore throat.   Cardiovascular: no chest pain, no edema, no palps and no syncope.   Respiratory: no cough,no s.o.b. and no wheezing  Gastrointestinal: no abdominal pain, No C/D no N/V, positive intermittent hemorrhoids  Genitourinary: no dysuria, no change in urinary frequency, no urinary hesitancy and no feelings of urinary urgency.   Musculoskeletal: no arthralgias,  no back pain and no myalgias.   Integumentary: no new skin lesions and no rashes.   Neurological: no difficulty walking, no headache, no limb weakness, no numbness and no tingling.  Positive mild memory loss  Psychiatric: no anxiety, no depression, no anhedonia and no substance use disorders.   Endocrine: no recent weight gain and no recent weight loss.   Hematologic/Lymphatic: no tendency for easy bruising and no swollen glands.  Objective   /72 (BP Location: Right arm, Patient Position: Sitting, BP Cuff Size: Adult)   Pulse 61   Wt 76.8 kg (169 lb 6.1 oz)   SpO2 94%   BMI 25.75 kg/m²     Physical Exam  gen- a & o x 3, nad, pleasant  heent- eomi, perrla, ear canals patent, TM's non-erythematous, no fluid, frontal and maxillary sinus's nontender  neck- supple, nontender, no palpable or enlarged nodes, no thyromegaly  heart- rrr, no murmurs  lungs- cta b/l , no w/r/r  chest- symmetric, nontender  ab- soft, nontender, no palpable organomegaly, postive bowel sounds  ex's- no c/c/e  neuro- CNs 2-12 grossly intact, full sensation and strength in all  extremities    Assessment/Plan     #1.  Medicare wellness visit.  No concerns on exam.  Screening labs ordered today.  Up-to-date with vaccinations.  Continue healthy diet, exercise habits.  We will add a men's multivitamin in addition to your vitamin D and B12 supplements.     2.  History of hypertension hyperlipidemia.  Med refill for metoprolol.  We will discontinue lovastatin     3.  History of BPH.  Continue Flomax.    #4.  He has had some issues with hemorrhoids.  Discussed daily fiber such as Metamucil or fiber supplements such as Gummies.  Would advise on about 15 to 20 g of fiber supplement each morning.  Try your best to get good fiber in your diet as well and optimize your hydration.  Try to normalize your bowel movements with regular fiber supplementation and avoid all straining and constipation.    5.  Mild memory issues.  However would advise on continuing.  Good habits.  Regular exercise.  Lots of reading.  And try to get out of the house and socialize

## 2024-04-16 LAB
25(OH)D3 SERPL-MCNC: 38 NG/ML (ref 30–100)
ALBUMIN SERPL BCP-MCNC: 4.4 G/DL (ref 3.4–5)
ALP SERPL-CCNC: 63 U/L (ref 33–136)
ALT SERPL W P-5'-P-CCNC: 15 U/L (ref 10–52)
ANION GAP SERPL CALC-SCNC: 11 MMOL/L (ref 10–20)
AST SERPL W P-5'-P-CCNC: 17 U/L (ref 9–39)
BASOPHILS # BLD AUTO: 0.02 X10*3/UL (ref 0–0.1)
BASOPHILS NFR BLD AUTO: 0.4 %
BILIRUB SERPL-MCNC: 0.6 MG/DL (ref 0–1.2)
BUN SERPL-MCNC: 17 MG/DL (ref 6–23)
CALCIUM SERPL-MCNC: 9.3 MG/DL (ref 8.6–10.6)
CHLORIDE SERPL-SCNC: 104 MMOL/L (ref 98–107)
CHOLEST SERPL-MCNC: 190 MG/DL (ref 0–199)
CHOLESTEROL/HDL RATIO: 2.9
CO2 SERPL-SCNC: 30 MMOL/L (ref 21–32)
CREAT SERPL-MCNC: 0.96 MG/DL (ref 0.5–1.3)
EGFRCR SERPLBLD CKD-EPI 2021: 76 ML/MIN/1.73M*2
EOSINOPHIL # BLD AUTO: 0.06 X10*3/UL (ref 0–0.4)
EOSINOPHIL NFR BLD AUTO: 1.2 %
ERYTHROCYTE [DISTWIDTH] IN BLOOD BY AUTOMATED COUNT: 14 % (ref 11.5–14.5)
GLUCOSE SERPL-MCNC: 102 MG/DL (ref 74–99)
HCT VFR BLD AUTO: 42 % (ref 41–52)
HDLC SERPL-MCNC: 65.1 MG/DL
HGB BLD-MCNC: 13.2 G/DL (ref 13.5–17.5)
IMM GRANULOCYTES # BLD AUTO: 0.01 X10*3/UL (ref 0–0.5)
IMM GRANULOCYTES NFR BLD AUTO: 0.2 % (ref 0–0.9)
LDLC SERPL CALC-MCNC: 104 MG/DL
LYMPHOCYTES # BLD AUTO: 1.31 X10*3/UL (ref 0.8–3)
LYMPHOCYTES NFR BLD AUTO: 25.8 %
MCH RBC QN AUTO: 32.4 PG (ref 26–34)
MCHC RBC AUTO-ENTMCNC: 31.4 G/DL (ref 32–36)
MCV RBC AUTO: 103 FL (ref 80–100)
MONOCYTES # BLD AUTO: 0.52 X10*3/UL (ref 0.05–0.8)
MONOCYTES NFR BLD AUTO: 10.2 %
NEUTROPHILS # BLD AUTO: 3.16 X10*3/UL (ref 1.6–5.5)
NEUTROPHILS NFR BLD AUTO: 62.2 %
NON HDL CHOLESTEROL: 125 MG/DL (ref 0–149)
NRBC BLD-RTO: 0 /100 WBCS (ref 0–0)
PLATELET # BLD AUTO: 135 X10*3/UL (ref 150–450)
POTASSIUM SERPL-SCNC: 4.1 MMOL/L (ref 3.5–5.3)
PROT SERPL-MCNC: 6.9 G/DL (ref 6.4–8.2)
RBC # BLD AUTO: 4.07 X10*6/UL (ref 4.5–5.9)
RBC MORPH BLD: NORMAL
SODIUM SERPL-SCNC: 141 MMOL/L (ref 136–145)
TRIGL SERPL-MCNC: 106 MG/DL (ref 0–149)
TSH SERPL-ACNC: 1.1 MIU/L (ref 0.44–3.98)
VIT B12 SERPL-MCNC: 586 PG/ML (ref 211–911)
VLDL: 21 MG/DL (ref 0–40)
WBC # BLD AUTO: 5.1 X10*3/UL (ref 4.4–11.3)

## 2024-04-17 LAB
PSA FREE MFR SERPL: <50 %
PSA FREE SERPL-MCNC: <0.1 NG/ML
PSA SERPL IA-MCNC: 0.2 NG/ML (ref 0–4)

## 2024-09-11 ENCOUNTER — OFFICE VISIT (OUTPATIENT)
Dept: ORTHOPEDIC SURGERY | Facility: CLINIC | Age: 89
End: 2024-09-11
Payer: MEDICARE

## 2024-09-11 ENCOUNTER — HOSPITAL ENCOUNTER (OUTPATIENT)
Dept: RADIOLOGY | Facility: CLINIC | Age: 89
Discharge: HOME | End: 2024-09-11
Payer: MEDICARE

## 2024-09-11 DIAGNOSIS — M25.551 RIGHT HIP PAIN: ICD-10-CM

## 2024-09-11 DIAGNOSIS — M79.18 BUTTOCK PAIN: ICD-10-CM

## 2024-09-11 PROCEDURE — 99204 OFFICE O/P NEW MOD 45 MIN: CPT | Performed by: ORTHOPAEDIC SURGERY

## 2024-09-11 PROCEDURE — 1036F TOBACCO NON-USER: CPT | Performed by: ORTHOPAEDIC SURGERY

## 2024-09-11 PROCEDURE — 1159F MED LIST DOCD IN RCRD: CPT | Performed by: ORTHOPAEDIC SURGERY

## 2024-09-11 PROCEDURE — 1157F ADVNC CARE PLAN IN RCRD: CPT | Performed by: ORTHOPAEDIC SURGERY

## 2024-09-11 PROCEDURE — 73502 X-RAY EXAM HIP UNI 2-3 VIEWS: CPT | Mod: RIGHT SIDE | Performed by: RADIOLOGY

## 2024-09-11 PROCEDURE — 1160F RVW MEDS BY RX/DR IN RCRD: CPT | Performed by: ORTHOPAEDIC SURGERY

## 2024-09-11 PROCEDURE — 73502 X-RAY EXAM HIP UNI 2-3 VIEWS: CPT | Mod: RT

## 2024-09-11 NOTE — PROGRESS NOTES
Topher Burroughs is  post-op from IMN r hip IT at [facility] in 2020. He presents today with Right hip pain/buttock pain for the last 2 days.  He is a very active person and this is really slowed him down.  He has no anterior groin pain and does not have any trouble sleeping on his right side.  Pain will also radiate down into the calf region.  All on the lateral side.        The patients full medical history, surgical history, medications, allergies, family, medical history, social history, and a complete 14 point review of systems is documented in the medical record on the signed, scanned medical intake sheet or reviewed in the history of present illness. Review of systems otherwise negative    Past Medical History:   Diagnosis Date    Disorder of left external ear, unspecified 08/12/2016    Skin lesion of left ear    Impacted cerumen, left ear 05/29/2015    Impacted cerumen of left ear    Joint disorder, unspecified     Shoulder joint dysfunction    Malignant neoplasm of prostate (Multi)     Prostate carcinoma    Nasal congestion 09/14/2015    Nasal congestion    Onycholysis 02/15/2016    Onycholysis    Overweight 12/05/2014    Overweight (BMI 25.0-29.9)    Pain in right shoulder 09/14/2015    Right shoulder pain    Personal history of diseases of the skin and subcutaneous tissue 01/25/2016    History of dermatitis    Personal history of malignant neoplasm of prostate 02/24/2017    History of prostate cancer    Personal history of other diseases of the circulatory system     History of hypertension    Personal history of other diseases of the respiratory system 12/22/2014    History of acute sinusitis    Personal history of other diseases of the respiratory system 12/22/2014    History of pharyngitis    Personal history of other infectious and parasitic diseases 02/15/2016    History of onychomycosis    Personal history of other specified conditions 03/12/2015    History of chest pain    Sensorineural hearing loss,  bilateral 05/29/2015    Asymmetrical sensorineural hearing loss    Unspecified hearing loss, bilateral 03/02/2022    Hearing loss of both ears    Xerosis cutis 02/15/2016    Xerosis cutis       Medication Documentation Review Audit       Reviewed by Chau Piper DO (Physician) on 04/15/24 at 0947      Medication Order Taking? Sig Documenting Provider Last Dose Status   amLODIPine (Norvasc) 5 mg tablet [id number] Yes Take 1 tablet (5 mg) by mouth once daily. Chau Piper DO Taking Active   cholecalciferol (Vitamin D-3) 25 MCG (1000 UT) tablet [id number] Yes Take 2 tablets (2,000 Units) by mouth once daily. Historical Provider, MD Taking Active   Discontinued 04/15/24 0941   cyanocobalamin (Vitamin B-12) 500 mcg tablet [id number] Yes Take 1 tablet (500 mcg) by mouth once daily. Historical Provider, MD Taking Active   Discontinued 04/15/24 0941   hydrocortisone (Anusol-HC) 2.5 % rectal cream [id number] Yes Insert into the rectum 2 times a day. Johny Huffman MD PhD Taking Active    Discontinued 04/15/24 0941   Discontinued 04/15/24 0941     Discontinued 04/15/24 0942   metoprolol succinate XL (Toprol-XL) 25 mg 24 hr tablet [id number] Yes Take 1 tablet (25 mg) by mouth once daily. Chau Piper DO  Active   multivitamin tablet [id number] Yes Take 1 tablet by mouth once daily. Historical Provider, MD Taking Active   Discontinued 04/15/24 0941    Discontinued 04/15/24 0941   Discontinued 04/15/24 0941   Discontinued 04/15/24 0941   Discontinued 04/15/24 0941   Discontinued 04/15/24 0941   tamsulosin (Flomax) 0.4 mg 24 hr capsule [id number] Yes Take 1 capsule (0.4 mg) by mouth once daily. Historical Provider, MD Taking Active   vit C/E/Zn/coppr/lutein/zeaxan (PRESERVISION AREDS-2 ORAL) [id number] Yes Take by mouth. Historical Provider, MD Taking Active                    No Known Allergies    Social History     Socioeconomic History    Marital status:      Spouse name: Not on file    Number of children: Not on file     Years of education: Not on file    Highest education level: Not on file   Occupational History    Not on file   Tobacco Use    Smoking status: Never    Smokeless tobacco: Never   Substance and Sexual Activity    Alcohol use: Yes     Comment: occasional    Drug use: Never    Sexual activity: Not on file   Other Topics Concern    Not on file   Social History Narrative    Not on file     Social Determinants of Health     Financial Resource Strain: Low Risk (5/24/2021)    [facility]    Financial Resource Strain     Sometimes people find that their income does not quite cover their living costs.  In the last 12 months, has this happened to you?: Don't know     What is your current work situation?: Not on file   Food Insecurity: Low Risk (5/24/2021)    [facility] from [facility]    Food Insecurity     Within the past 12 months we worried whether our food would run out before we got the money to buy more.: Sometimes true     Within the past 12 months the food we bought just didn't last and we didn't have money to get more. : Sometimes true   Transportation Needs: Not on file   Physical Activity: Not on file   Stress: Low Risk (5/24/2021)    Received from [facility]    Stress     Over the last 2 weeks, how often have you been bothered by the following problems: feeling nervous, anxious, on edge?: Several days     Over the last 2 weeks, how often have you been bothered by the following problems: Not being able to stop or control worrying?: Several days   Social Connections: Low Risk (5/24/2021)    Received from [facility] ([facility]    Social Connections     How often do you feel that you lack companionship?: Some of the time     How often do you feel left out?: Some of the time     How often do you feel isolated from  others?: Some of the time   Intimate Partner Violence: Not on file   Housing Stability: Not on file       Past Surgical History:   Procedure Laterality Date    COLONOSCOPY  08/03/2018    Colonoscopy (Fiberoptic)    OTHER SURGICAL HISTORY  04/29/2022    Leg surgery    OTHER SURGICAL HISTORY  04/29/2022    Eye surgery    OTHER SURGICAL HISTORY  05/02/2022    Hernia repair       Gen: The patient is alert and oriented ×3, is in no acute distress, and appear their stated age and weight.    Psychiatric: Mood and affect are appropriate.    Eyes: Sclera are white, and pupils are round and symmetric.    ENT: Mucous membranes are moist.     Neck: Supple. Thyroid is midline.    Respiratory: Respirations are nonlabored, chest rise is symmetric.    Cardiac: Rate is regular by palpation of distal pulses.     Abdomen: Nondistended.    Integument: No obvious cutaneous lesions are noted. No signs of lymphangitis. No signs of systemic edema.  side: right lower extremity :  his  surgical incisions are healing well, without evidence of erythema, fluctuance, drainage, or infection.  The skin around the incision is intact.  Distally neurovascular exam is stable.  There is appropriate tenderness to palpation in the jr-incisional area. No calf swelling or tenderness to palpation.  No tenderness to palpation over the greater trochanter.      I personally reviewed multiple views of right hip were obtained in the office today demonstrate maintenance of reduction, interval healing, and a stable position of the hardware.      Topher Burroughs is a 89 y.o. male patient status post  [facility].   I went over his x-rays in detail today.   he is WBAT of the side: right lower extremity. ~He/she~ is range of motion as tolerated of the side: right lower extremity.  He has no anterior groin pain is not really having any symptoms of arthritis.  He also has no tenderness palpation over the greater trochanter do not think that he has  trochanteric bursitis.  His pain is also located sort of towards the buttock region my high suspicion that is that he is having some pain coming from his back.  He is fracture is completely healed of the intertrochanteric region and this is not causing any of his symptoms either.  We will get him evaluated by one of the nonoperative spine specialists.  He likely will need an MRI but I did inform them that this is all outside of my expertise.  I stressed the importance of physical therapy on overall functional outcome. I answered all patient's questions he agrees with treatment plan.  I will see him back in Follow-up as necessary..        Neil Arevalo    Department of Orthopaedic Trauma Surgery

## 2024-10-01 ENCOUNTER — APPOINTMENT (OUTPATIENT)
Dept: PRIMARY CARE | Facility: CLINIC | Age: 89
End: 2024-10-01
Payer: MEDICARE

## 2024-10-21 ENCOUNTER — CLINICAL SUPPORT (OUTPATIENT)
Dept: AUDIOLOGY | Facility: CLINIC | Age: 89
End: 2024-10-21
Payer: MEDICARE

## 2024-10-21 ENCOUNTER — APPOINTMENT (OUTPATIENT)
Dept: ORTHOPEDIC SURGERY | Facility: CLINIC | Age: 89
End: 2024-10-21
Payer: MEDICARE

## 2024-10-21 ENCOUNTER — HOSPITAL ENCOUNTER (OUTPATIENT)
Dept: RADIOLOGY | Facility: CLINIC | Age: 89
Discharge: HOME | End: 2024-10-21
Payer: MEDICARE

## 2024-10-21 ENCOUNTER — OFFICE VISIT (OUTPATIENT)
Dept: OTOLARYNGOLOGY | Facility: CLINIC | Age: 89
End: 2024-10-21
Payer: MEDICARE

## 2024-10-21 VITALS
DIASTOLIC BLOOD PRESSURE: 75 MMHG | BODY MASS INDEX: 25.01 KG/M2 | SYSTOLIC BLOOD PRESSURE: 124 MMHG | WEIGHT: 165 LBS | HEART RATE: 66 BPM | HEIGHT: 68 IN | TEMPERATURE: 97.4 F

## 2024-10-21 DIAGNOSIS — M47.816 LUMBAR SPONDYLOSIS: ICD-10-CM

## 2024-10-21 DIAGNOSIS — M79.18 BUTTOCK PAIN: ICD-10-CM

## 2024-10-21 DIAGNOSIS — H90.A32 MIXED CONDUCTIVE AND SENSORINEURAL HEARING LOSS OF LEFT EAR WITH RESTRICTED HEARING OF RIGHT EAR: ICD-10-CM

## 2024-10-21 DIAGNOSIS — R94.128 ABNORMAL TYMPANOGRAM: ICD-10-CM

## 2024-10-21 DIAGNOSIS — M54.16 LUMBAR RADICULITIS: ICD-10-CM

## 2024-10-21 DIAGNOSIS — M54.16 LUMBAR RADICULITIS: Primary | ICD-10-CM

## 2024-10-21 DIAGNOSIS — H90.A21 SENSORINEURAL HEARING LOSS (SNHL) OF RIGHT EAR WITH RESTRICTED HEARING OF LEFT EAR: Primary | ICD-10-CM

## 2024-10-21 DIAGNOSIS — H90.3 SENSORINEURAL HEARING LOSS (SNHL), BILATERAL: Primary | ICD-10-CM

## 2024-10-21 DIAGNOSIS — H92.02 LEFT EAR PAIN: ICD-10-CM

## 2024-10-21 PROCEDURE — 99213 OFFICE O/P EST LOW 20 MIN: CPT | Performed by: NURSE PRACTITIONER

## 2024-10-21 PROCEDURE — 92567 TYMPANOMETRY: CPT | Performed by: AUDIOLOGIST

## 2024-10-21 PROCEDURE — 1157F ADVNC CARE PLAN IN RCRD: CPT | Performed by: NURSE PRACTITIONER

## 2024-10-21 PROCEDURE — 3078F DIAST BP <80 MM HG: CPT | Performed by: NURSE PRACTITIONER

## 2024-10-21 PROCEDURE — 1157F ADVNC CARE PLAN IN RCRD: CPT | Performed by: PHYSICAL MEDICINE & REHABILITATION

## 2024-10-21 PROCEDURE — 1036F TOBACCO NON-USER: CPT | Performed by: NURSE PRACTITIONER

## 2024-10-21 PROCEDURE — 3074F SYST BP LT 130 MM HG: CPT | Performed by: NURSE PRACTITIONER

## 2024-10-21 PROCEDURE — 72100 X-RAY EXAM L-S SPINE 2/3 VWS: CPT

## 2024-10-21 PROCEDURE — 99203 OFFICE O/P NEW LOW 30 MIN: CPT | Performed by: PHYSICAL MEDICINE & REHABILITATION

## 2024-10-21 PROCEDURE — 92557 COMPREHENSIVE HEARING TEST: CPT | Performed by: AUDIOLOGIST

## 2024-10-21 PROCEDURE — 1159F MED LIST DOCD IN RCRD: CPT | Performed by: PHYSICAL MEDICINE & REHABILITATION

## 2024-10-21 PROCEDURE — 72100 X-RAY EXAM L-S SPINE 2/3 VWS: CPT | Performed by: STUDENT IN AN ORGANIZED HEALTH CARE EDUCATION/TRAINING PROGRAM

## 2024-10-21 PROCEDURE — 1126F AMNT PAIN NOTED NONE PRSNT: CPT | Performed by: NURSE PRACTITIONER

## 2024-10-21 PROCEDURE — 1036F TOBACCO NON-USER: CPT | Performed by: PHYSICAL MEDICINE & REHABILITATION

## 2024-10-21 PROCEDURE — 1159F MED LIST DOCD IN RCRD: CPT | Performed by: NURSE PRACTITIONER

## 2024-10-21 PROCEDURE — 1160F RVW MEDS BY RX/DR IN RCRD: CPT | Performed by: PHYSICAL MEDICINE & REHABILITATION

## 2024-10-21 RX ORDER — CIPROFLOXACIN 250 MG/1
1 TABLET, FILM COATED ORAL
COMMUNITY
Start: 2024-07-09

## 2024-10-21 RX ORDER — DICLOFENAC SODIUM 10 MG/G
GEL TOPICAL
COMMUNITY
Start: 2024-09-09

## 2024-10-21 RX ORDER — TIZANIDINE HYDROCHLORIDE 2 MG/1
CAPSULE, GELATIN COATED ORAL
COMMUNITY
Start: 2024-09-09

## 2024-10-21 SDOH — ECONOMIC STABILITY: FOOD INSECURITY: WITHIN THE PAST 12 MONTHS, YOU WORRIED THAT YOUR FOOD WOULD RUN OUT BEFORE YOU GOT MONEY TO BUY MORE.: NEVER TRUE

## 2024-10-21 SDOH — ECONOMIC STABILITY: FOOD INSECURITY: WITHIN THE PAST 12 MONTHS, THE FOOD YOU BOUGHT JUST DIDN'T LAST AND YOU DIDN'T HAVE MONEY TO GET MORE.: NEVER TRUE

## 2024-10-21 SDOH — SOCIAL STABILITY: SOCIAL NETWORK: SOCIAL ACTIVITY:: 9

## 2024-10-21 ASSESSMENT — LIFESTYLE VARIABLES
HOW MANY STANDARD DRINKS CONTAINING ALCOHOL DO YOU HAVE ON A TYPICAL DAY: 1 OR 2
SKIP TO QUESTIONS 9-10: 1
HOW OFTEN DO YOU HAVE SIX OR MORE DRINKS ON ONE OCCASION: NEVER
HOW OFTEN DO YOU HAVE A DRINK CONTAINING ALCOHOL: 4 OR MORE TIMES A WEEK
AUDIT-C TOTAL SCORE: 4

## 2024-10-21 ASSESSMENT — COLUMBIA-SUICIDE SEVERITY RATING SCALE - C-SSRS
1. IN THE PAST MONTH, HAVE YOU WISHED YOU WERE DEAD OR WISHED YOU COULD GO TO SLEEP AND NOT WAKE UP?: NO
2. HAVE YOU ACTUALLY HAD ANY THOUGHTS OF KILLING YOURSELF?: NO
6. HAVE YOU EVER DONE ANYTHING, STARTED TO DO ANYTHING, OR PREPARED TO DO ANYTHING TO END YOUR LIFE?: NO

## 2024-10-21 ASSESSMENT — PATIENT HEALTH QUESTIONNAIRE - PHQ9
SUM OF ALL RESPONSES TO PHQ9 QUESTIONS 1 AND 2: 0
2. FEELING DOWN, DEPRESSED OR HOPELESS: NOT AT ALL
1. LITTLE INTEREST OR PLEASURE IN DOING THINGS: NOT AT ALL

## 2024-10-21 ASSESSMENT — ENCOUNTER SYMPTOMS
OCCASIONAL FEELINGS OF UNSTEADINESS: 0
DEPRESSION: 0
LOSS OF SENSATION IN FEET: 0

## 2024-10-21 ASSESSMENT — PAIN SCALES - GENERAL: PAINLEVEL_OUTOF10: 0-NO PAIN

## 2024-10-21 NOTE — PROGRESS NOTES
Subjective   Patient ID: Topher Burroughs is a 89 y.o. male who presents for Earache.    HPI  INITIAL VISIT 6/9/2022:  Topher Burroughs is an 87 year-old male here for evaluation of his ears. He is accompanied his daughter. He is having trouble hearing out of his left ear that started a couple of months ago. He gets tinnitus, L > R. No ear pain, drainage, or dizziness. No previous ear surgery. No loud noise exposure. His son has hearing loss.      1/24/2023: Patient following up for his left ear. He is accompanied by his daughter. He feels that the left ear is full, he can't hear as well in this ear. No pain, but he did have a recent ear infection.     10/21/2024: Patient here for left ear. He is accompanied by a family member. Was having left ear pain that started a couple of weeks ago. Resolved with Tylenol. He does have hearing loss. He wears bilateral hearing aids. Doesn't feel his hearing has changed, sometimes the left feels worse to him.    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    Tinea pedis    Intertrochanteric fracture of right femur, closed, initial encounter    Leukocytosis    Hemorrhoids    Arthropathy of shoulder region    Bilateral hearing loss    Eczema    Carcinoma of prostate (Multi)    History of malignant neoplasm of prostate    Impacted cerumen of left ear    Throat pain    Sensation of plugged ear on left side    Temporal headache     Past Surgical History:   Procedure Laterality Date    COLONOSCOPY  08/03/2018     Colonoscopy (Fiberoptic)    OTHER SURGICAL HISTORY  04/29/2022    Leg surgery    OTHER SURGICAL HISTORY  04/29/2022    Eye surgery    OTHER SURGICAL HISTORY  05/02/2022    Hernia repair     Review of Systems    All other systems have been reviewed and are negative for complaints except for those mentioned in history of present illness, past medical history and problem list.    Objective   Physical Exam    Constitutional: No fever, chills, weight loss or weight gain  General appearance: Appears well, well-nourished, well groomed. No acute distress.    Communication: Normal communication    Psychiatric: Oriented to person, place and time. Normal mood and affect.    Neurologic: Cranial nerves II-XII grossly intact and symmetric bilaterally.    Head and Face:  Head: Atraumatic with no masses, lesions or scarring.  Face: Normal symmetry. No scars or deformities.  TMJ: Normal, no trismus.    Eyes: Conjunctiva not edematous or erythematous.     Right Ear: External inspection of ear with no deformity, scars, or masses. EAC is clear.  TM is intact with no sign of infection, effusion, or retraction.  No perforation seen.     Left Ear: External inspection of ear with no deformity, scars, or masses. EAC is clear.  TM is intact with no sign of infection, effusion, or retraction.  No perforation seen.     Nose: External inspection of nose: No nasal lesions, lacerations or scars. Anterior rhinoscopy with limited visualization past the inferior turbinates. No tenderness on frontal or maxillary sinus palpation.    Oral Cavity/Mouth: Oral cavity and oropharynx mucosa moist and pink. No lesions or masses. Dentition normal. Tonsils appear normal. Uvula is midline. Tongue with no masses or lesions. Tongue with good mobility. The oropharynx is clear.    Neck: Normal appearing, symmetric, trachea midline.     Cardiovascular: Examination of peripheral vascular system shows no clubbing or cyanosis.    Respiratory: No respiratory distress  increased work of breathing. Inspection of the chest with symmetric chest expansion and normal respiratory effort.    Skin: No head and neck rashes.    Lymph nodes: No adenopathy.     Diagnostic Results         Assessment/Plan   Diagnoses and all orders for this visit:  Sensorineural hearing loss (SNHL) of right ear with restricted hearing of left ear  Mixed conductive and sensorineural hearing loss of left ear with restricted hearing of right ear  Abnormal tympanogram  Left ear pain  Reviewed audiogram in detail.  Reassurance given that exam looks essentially normal; however, tympanogram shows eustachian tube dysfunction on the left side.  I recommended Flonase 2 sprays each nostril once a day.  I recommend continuing this for the next 4 to 6 weeks.  Follow-up in 6 weeks.  If his pain returns I recommend calling the office sooner.  All questions answered to patient's satisfaction.    This note was created using speech recognition transcription software. Despite proofreading, several typographical errors might be present that might affect the meaning of the content. Please call with any questions.         GITA Browne-CNP 10/21/24 1:44 PM

## 2024-10-21 NOTE — PROGRESS NOTES
"AUDIOLOGY ADULT AUDIOMETRIC EVALUATION      Name:  Topher Burroughs  :  1934  Age:  89 y.o.  Date of Evaluation:  10/21/2024    HISTORY  Reason for visit:  monitoring  Mr. Burroughs is seen 10/21/2024 at the request of Erin Dozier CNP for an evaluation of hearing.      Chief complaint:    Monitoring  - no ear pain at this time    Hearing loss:  left hearing is worse than right  Tinnitus:   constant tinnitus, left more than right  Otitis Media: denies  Otologic surgical history:  denies  Dizziness/imbalance:  denies  Otalgia:  none at this time  Ear pressure/fullness:  left ear fullness  History of excessive noise exposure:  denies   Other: none    Hearing aid history: bilateral -in-the-ear hearing aids, aids are about 5-6 years old and were fit for him about 2 years ago (first pair)         EVALUATION  Please find audiogram in \"Media\" tab (Document Type:  Audiology Report) or included at the bottom of this note.    RESULTS   Otoscopic Evaluation: non-occlusive cerumen bilaterally      Immittance Measures (226 Hz probe tone):     Right ear:  Tympanometry is consistent with normal middle ear pressure and normal tympanic membrane mobility.     Left ear: tympanometry is consistent with low middle ear pressure and normal tympanic membrane mobility.   note wide traces bilaterally     Test technique:  standard behavioral technique via insert earphones.  Reliability is good.    Pure Tone Audiometry:    Right ear:  Hearing sensitivity is in the normal hearing to severe hearing loss range  Left ear: Hearing sensitivity is in the mild to severe hearing loss range.      Speech Audiometry:        Right Ear:  Speech Reception Threshold (SRT) was obtained at 30 dBHL                 Speech discrimination score was 88% in quiet when words were presented at 80 dBHL      Left Ear:  Speech Reception Threshold (SRT) was obtained at 45 dBHL                 Speech discrimination score was 68% in quiet when words were " presented at 90 dBHL    IMPRESSIONS:  In comparison with previous audiogram of 2/9/2023, there has been worsening of speech discrimination scores bilaterally.  Pure-tone thresholds are stable bilaterally.      Patient is expected to experience communication difficulty in all listening situations.  Patient is expected to continue to benefit from devices that provide amplification (e.g., hearing aids) and improve the desired sound signal over that of background noise, as well as from effective communication strategies.       RECOMMENDATIONS  Continue with medical follow-up with Erin Dozier CNP.  Continue with hearing aid use.     Reassess hearing in 1 year (or sooner if medically indicated or if there is a concern for a change in hearing).    Continue with medical follow-up as indicated.       PATIENT EDUCATION  Discussed results and recommendations with patient and his son Jim.  Questions were addressed and the patient was encouraged to contact our department should concerns arise.       PETE Cabezas, [facility]  Licensed Audiologist

## 2024-10-21 NOTE — PROGRESS NOTES
New Consult/New Patient Note    10/21/2024   Neil Arevalo MD    Assessment: Very pleasant 89-year-old male who presents with improving right lower back and leg pain.  -Suspect right lumbar radiculitis and L5 or S1 pattern    PLAN:  1)  Imaging/Diagnostic Studies: We will obtain lumbar x-rays to further assess  2)  Therapy/Rehabilitation: Consult provided for physical therapy  3)  Pharmacological Management: Deferred at this time his pain symptoms are improved will consider low-dose gabapentin in the future  4)  Spine/Surgical Interventions: None at this time  5)  Alternative Treatments: May consider alternative treatment options in the future including manipulation (chiropractor versus osteopathic) and/or acupuncture if patient does not obtain optimal relief with initial treatment plan.  6)  Consultations: Physical therapy  7)  Follow -up: 4-6 weeks or PRN if symptoms worsen/do not improve.   8)  Future treatment considerations: Lumbar MRI to further assess.  Trial of low-dose gabapentin    Patient advised of the difference between hurt and harm and advised to continue with all normal activities and exercises. Patient verbalized understanding of the above plan and was happy with the care provided.      The above clinical summary has been dictated with voice recognition software. It has not been proofread for grammatical errors, typographical mistakes, or other semantic inconsistencies.    Thank you for visiting our office today. It was our pleasure to take part in your healthcare.     Do not hesitate to call with any questions regarding your plan of care after leaving at [phone]    To clinicians, thank you very much for this kind referral. It is a privilege to partner with you in the care of your patients. My office would be delighted to assist you with any further consultations or with questions regarding the plan of care outlined. Do not hesitate to call the office or contact me directly.      Sincerely,    BERTRAM Hawkins MD  , Physical Medicine and Rehabilitation, Orthopedic Spine  [facility] [employer]  [facility] [place]         Topher Burroughs is a 89 y.o. male who presents with foot and ankle pain after laying on the couch and stretching.  Notes pain since Sept 12th, back pain is isg. Improved but he notes some paraesthesia to the top of the foot.    Location:  Right shing and lateral calf.   Radiation:  Right shin, and lateral calf.  Notes some weakness in the right foot and ankle compared to left.  Denies any foot drop.    Quality:  achy   current 0/10,  at its worst  7/10  Exacerbated by walking  Relieved by rest  Onset, traumatic event:  no recent  Has tried:  rest,     Valsalva sign is neg  Grocery cart sign is pos  Smoker:  no  Does not wake them at night  Litigation: none    Patient denies bowel/bladder incontinence, denies fever, denies unintentional weight loss, denies clumsiness of hands, feet, or dropping things.  Denies any constitutional or myelopathic symptomatology.      PREVIOUS TREATMENTS  IN THE LAST SIX MONTHS     Active conservative therapy  in the last six months (see below)              1. Physical therapy:  no                                                                                  2. Home exercise program after PT: no                                                      3. A physician supervised home exercise program (HEP): no              4. Chiropractic Care: no                                                                     Passive conservative therapy  in the last six months (see below)              1. NSAIDS:  voiding                                                                                                     2. Prescription pain medication:  tizanidine                                                           3. Acupuncture:                                                                                              4. Tens unit:      Assistive Devices: cane    Work status: retired       ROS: Other than listed in HPI, PMHX below, and intake paperwork including a 30 point patient-recorded review of symptoms which was personally reviewed and inclusive of no history of unintentional weight loss, change in appetite, significant malaise, fevers, chills, or change in bowel/bladder, shortness of breath, or chest pain.    I have confirmed and edited as necessary Past Medical, Past Surgical, Family, Social History and ROS as obtained by others. These were also obtained on new patient forms.      PHYSICAL EXAM:   GENERAL APPEARANCE:  Well nourished, well developed, and no apparent distress.  NEURO PSYCH: Patient oriented to person, place, Mood pleasant. Benign affect.  MUSCULOSKELETAL and NEUROLOGICAL       VISUAL INSPECTION           LUMBAR: WNL  MUSCLE BULK: Normal and symmetrical in the upper & lower extremities.  MUSCLE TONE: Normal  MOTOR: 5/5 in all muscle groups tested in bilateral lower extremities   SENSORY: Decreased light touch at the right L5 and S1  GAIT: Steady with use of cane.  No significant balance deficit appreciated  Slump testing: Negative on the right  PERIPHERAL JOINT ROM:   HIP ROM: Full bilaterally  CINDI/Thigh Thrust/Compression/Sharath Finger:  Negative bilaterally   Hip Exam including thigh thrust and LOG ROLL: Negative bilaterally    DATA REVIEW:   The below imaging studies were personally reviewed and discussed with the patient.    Medical Decision Making:  The above note constitutes a Moderate to High level of medical decision making based on past data and imaging review, new and chronic symptoms with exacerbation, change in weakness or sensation, new imaging and diagnostic studies ordered, discussion of potential interventional or surgical treatment options, acute or chronic pain that may pose a threat to bodily function.    Past Medical History:   Diagnosis Date     Disorder of left external ear, unspecified 08/12/2016    Skin lesion of left ear    Impacted cerumen, left ear 05/29/2015    Impacted cerumen of left ear    Joint disorder, unspecified     Shoulder joint dysfunction    Malignant neoplasm of prostate (Multi)     Prostate carcinoma    Nasal congestion 09/14/2015    Nasal congestion    Onycholysis 02/15/2016    Onycholysis    Overweight 12/05/2014    Overweight (BMI 25.0-29.9)    Pain in right shoulder 09/14/2015    Right shoulder pain    Personal history of diseases of the skin and subcutaneous tissue 01/25/2016    History of dermatitis    Personal history of malignant neoplasm of prostate 02/24/2017    History of prostate cancer    Personal history of other diseases of the circulatory system     History of hypertension    Personal history of other diseases of the respiratory system 12/22/2014    History of acute sinusitis    Personal history of other diseases of the respiratory system 12/22/2014    History of pharyngitis    Personal history of other infectious and parasitic diseases 02/15/2016    History of onychomycosis    Personal history of other specified conditions 03/12/2015    History of chest pain    Sensorineural hearing loss, bilateral 05/29/2015    Asymmetrical sensorineural hearing loss    Unspecified hearing loss, bilateral 03/02/2022    Hearing loss of both ears    Xerosis cutis 02/15/2016    Xerosis cutis       Medication Documentation Review Audit       Reviewed by John Hawkins MD (Physician) on 10/21/24 at 0917      Medication Order Taking? Sig Documenting Provider Last Dose Status   amLODIPine (Norvasc) 5 mg tablet [id number]  Take 1 tablet (5 mg) by mouth once daily. Chau Piper,   Active   cholecalciferol (Vitamin D-3) 25 MCG (1000 UT) tablet [id number]  Take 2 tablets (2,000 Units) by mouth once daily. Historical Provider, MD  Active   ciprofloxacin (Cipro) 250 mg tablet [id number] Yes Take 1 tablet (250 mg) by mouth every 12 hours.  Historical Provider, MD  Active   cyanocobalamin (Vitamin B-12) 500 mcg tablet [id number]  Take 1 tablet (500 mcg) by mouth once daily. Historical Provider, MD  Active   diclofenac sodium (Voltaren) 1 % gel [id number] Yes APPLY 2 GRAMS TO THE AFFECTED AREA(S) BY TOPICAL ROUTE 4 TIMES PER DAY Historical Provider, MD  Active   hydrocortisone (Anusol-HC) 2.5 % rectal cream [id number]  Insert into the rectum 2 times a day. Johny Huffman MD PhD   04/15/24 5078   metoprolol succinate XL (Toprol-XL) 25 mg 24 hr tablet [id number]  Take 1 tablet (25 mg) by mouth once daily. Chau Piper DO  Active   multivitamin tablet [id number]  Take 1 tablet by mouth once daily. Historical Provider, MD  Active   tamsulosin (Flomax) 0.4 mg 24 hr capsule [id number]  Take 1 capsule (0.4 mg) by mouth once daily. Historical Provider, MD  Active   tiZANidine (Zanaflex) 2 mg capsule [id number] Yes Take 1 capsule every 12 hours by oral route as needed. Historical Provider, MD  Active   vit C/E/Zn/coppr/lutein/zeaxan (PRESERVISION AREDS-2 ORAL) [id number]  Take by mouth. Historical Provider, MD  Active                    No Known Allergies    Social History     Socioeconomic History    Marital status:      Spouse name: Not on file    Number of children: Not on file    Years of education: Not on file    Highest education level: Not on file   Occupational History    Not on file   Tobacco Use    Smoking status: Never    Smokeless tobacco: Never   Substance and Sexual Activity    Alcohol use: Yes     Comment: occasional    Drug use: Never    Sexual activity: Not on file   Other Topics Concern    Not on file   Social History Narrative    Not on file     Social Drivers of Health     Financial Resource Strain: Low Risk (2021)    [facility]    Financial Resource Strain     Sometimes people find that their income does not quite cover their living costs.  In the last 12  months, has this happened to you?: Don't know     What is your current work situation?: Not on file   Food Insecurity: Low Risk (5/24/2021)    [facility]    Food Insecurity     Within the past 12 months we worried whether our food would run out before we got the money to buy more.: Sometimes true     Within the past 12 months the food we bought just didn't last and we didn't have money to get more. : Sometimes true   Transportation Needs: Not on file   Physical Activity: Not on file   Stress: Low Risk (5/24/2021)    [facility]    Stress     Over the last 2 weeks, how often have you been bothered by the following problems: feeling nervous, anxious, on edge?: Several days     Over the last 2 weeks, how often have you been bothered by the following problems: Not being able to stop or control worrying?: Several days   Social Connections: Low Risk (5/24/2021)    [facility]    Social Connections     How often do you feel that you lack companionship?: Some of the time     How often do you feel left out?: Some of the time     How often do you feel isolated from others?: Some of the time   Intimate Partner Violence: Not on file   Housing Stability: Not on file       Past Surgical History:   Procedure Laterality Date    COLONOSCOPY  08/03/2018    Colonoscopy (Fiberoptic)    OTHER SURGICAL HISTORY  04/29/2022    Leg surgery    OTHER SURGICAL HISTORY  04/29/2022    Eye surgery    OTHER SURGICAL HISTORY  05/02/2022    Hernia repair

## 2024-11-13 NOTE — PROGRESS NOTES
Physical Therapy Evaluation    Patient Name: Topher Burroughs  MRN: [id number]  Today's Date: 11/14/2024  Time Calculation  Start Time: 1200  Stop Time: 1240  Time Calculation (min): 40 min     Insurance:  Visit number #1  Insurance Type: Payor: [employer] / Plan: [facility] [employer] / Product Type: *No Product type* / VISITS ARE MED NEC NO AUTH NEEDED   Authorization or Plan of Care date Range: n/a    Problem List Items Addressed This Visit             ICD-10-CM    Lumbar radiculitis M54.16    Relevant Orders    Follow Up In Physical Therapy    Lumbar spondylosis M47.816    Relevant Orders    Follow Up In Physical Therapy     General:  Reason for visit: Lumbar spondylosis/radiculopathy R LE  Referred by: John Hawkins MD  Next MD appt: nothing is scheduled      Precautions:  none  Fall Risk: Moderate     Assessment:   Topher Burroughs  is a 89 y.o. old patient who participated in a physical therapy evaluation today due to lumbar radiculopathy .     Topher presents with signs and symptoms consistent with DDD/DJD.     Topher's impairments include: balance deficits, gait deficits, postural deficits, pain, decreased strength, and decreased functional mobility.       Due to these impairments, he has the following functional limitations and participation restrictions:  increased fall risk, difficulty with ambulation, difficulty with stair negotiation, difficulty performing household activities, difficulty with completing/performing some ADLs/IADLs, and increased time to complete ADLs/IADLs.     Topher's clinical presentation is Stable and/or uncomplicated characteristics with the level of complexity being low.     Topher's potential for rehab is good.      Skilled physical therapy services are appropriate and beneficial in order to achieve measurable and meaningful change in the objective tests and measures. Utilization of skilled physical therapy services will aid in advancing his functional status and  attaining his therapy-related goals.     Topher verbalized understanding and is in agreement with all goals and plan of care.  Topher left session with all questions answered and no increase in symptoms.        Plan:  1x per week for 6 weeks    Recommended Treatment:  Therapeutic exercise, Manual therapy, Gait training, Home program instruction and progression, Neuromuscular re-education, Therapeutic activities, Self care and home management, Instruction in activity modification, Electrical stimulation, and Cryotherapy    Goals:  -Patient to be Indep with [facility] for self management of symptoms    -Abolish LE radiating/radicular symptoms    -Modified Oswestry: 0-19% impairment to indicate a significant improvement in overall function.    -Patient will demonstrate correct posture with min to no cueing to allow for correct loading strategy.    -Patient will demonstrate 5/5 hip strength to allow for correct gait and stair mechanics.     -Patient will demo mild to no limitation AROM of the lumbar spine to allow for correct mechanics with functional mobility.     -Patient will report standing 60 minutes  without pain to allow for return to work and ADLs without limitation.     -Patient will report sitting 60 minutes without pain to allow for return to work and ADLs without limitation.    -Patient will demonstrate appropriate body mechanics for household and common activities to reduce incidence or future back pain exacerbations     Subjective:  - CC: Patient arrives with complaint of low back pain and radiating pain R shin to top of foot. No pain L side. Lumbar pain is less than it was before. Denies numbness or tingling  - DOI: no injuries, pain increased sept this year  - DOS: n/a  - ROSANNA: chronic  - IMAGING: xrays  - PAIN: CURRENT: (1/10); BEST: (1/10); WORST: (5/10); LOCATION: (R anterior shin and foot)  - ALLEVIATES PAIN: resting  - EXACERBATES PAIN: walking, prolonged standing  - CURRENT MEDICAL MANAGEMENT: see chart,  "no pain medication currently  - PLOF: R femur fracture 2004  - FUNCTIONAL LIMITATIONS: Stair negotiation, Walking > 10 minutes, and Standing > 10 minutes  - LIVING ENVIRONMENT: no barriers  - WORK: retired  - EXERCISE: wants to walk  - PATIENT'S GOAL: dec pain with walking    Medical History Form: Reviewed (scanned into chart)    Objective:   Denies: Loss of bladder or bowel function Change in bowel / bladder function that correlates with onset of LBP Saddle anesthesia History of malignancy Osteoporosis    Gait: The patient is ambulating with the following device: he is not using a device.. Gait deviations include: Mildly antalgic, Lacks heel strike, Decreased velocity, Shuffling, Downward gaze, and Forward flexed.    LE DERMATOMES:  Lower extremity sensation is intact.    PALPATION: unremarkable    LE MYOTOMES:  Lower extremity myotomes are WNL bilaterally. Minimal weakness R L4    LUMBAR ROM;   LUMBAR TEST MOVEMENTS IN STANDING - Movement Loss indicated by Major, Moderate, Minimal, or Nil: .  Side Bend R: Moderate   Side Bend L: Moderate   FIS: Minimal  Rep FIS: No worse  EIS: Moderate  Rep EIS: No worse    LUMBAR TEST MOVEMENTS IN LYING - Movement Loss indicated by Major, Moderate, Minimal, or Nil: .  ABE: Minimal  Rep ABE: No worse      Special Tests for Lumbar Spine: .  Straight leg raise R/L: negative / negative  Slump R/L: negative/ negative  Crossed Leg straight leg raise R/L: negative /negative  Femoral nerve tension R/L: negative /negative      Outcome Measures:  Other Measures  Oswestry Disablity Index (GEE): 14     Treatment Performed: (\"NP\" = Not Performed)     Therapeutic Exercise:  15 minutes  Access Code: [email or web address]  URL: [email or web address]  Date: 11/14/2024  Prepared by: Chau Tariq    Exercises  - Seated Lumbar Flexion Stretch  - 1 x daily - 7 x weekly - 10 reps - 10 hold  - Supine Double Knee to Chest  - 1 x daily - 7 x weekly - 10 reps - 10 hold  - Supine Single Knee to " Chest Stretch  - 1 x daily - 7 x weekly - 10 reps - 10 hold  - Supine Transversus Abdominis Bracing - Hands on Stomach  - 1 x daily - 7 x weekly - 20 reps - 5 hold  - Seated Hamstring Stretch  - 1 x daily - 7 x weekly - 10 reps - 10 hold  - Seated Toe Raise  - 1 x daily - 7 x weekly - 2 sets - 10 reps  - Heel Toe Raises with Counter Support  - 1 x daily - 7 x weekly - 2 sets - 10 reps    Manual Therapy:    minutes      Neuromuscular Re-education:  minutes      Gait Training:    minutes      Therapeutic Activity:  minutes      Modalities:  Vasopneumatic Device  minutes  Electrical Stimulation  minutes  Ultrasound  minutes  Iontophoresis  minutes  Cold Pack  minutes    Evaluation Complexity: Low: 25 minutes; Moderate   minutes; Complex PT Evaluation Time Entry: 25;  minutes    Re-Evaluation:   minutes

## 2024-11-14 ENCOUNTER — EVALUATION (OUTPATIENT)
Dept: PHYSICAL THERAPY | Facility: CLINIC | Age: 89
End: 2024-11-14
Payer: MEDICARE

## 2024-11-14 DIAGNOSIS — M47.816 LUMBAR SPONDYLOSIS: ICD-10-CM

## 2024-11-14 DIAGNOSIS — M54.16 LUMBAR RADICULITIS: ICD-10-CM

## 2024-11-14 PROCEDURE — 97161 PT EVAL LOW COMPLEX 20 MIN: CPT | Mod: GP | Performed by: PHYSICAL THERAPIST

## 2024-11-14 PROCEDURE — 97110 THERAPEUTIC EXERCISES: CPT | Mod: GP | Performed by: PHYSICAL THERAPIST

## 2024-11-19 ENCOUNTER — APPOINTMENT (OUTPATIENT)
Dept: PHYSICAL THERAPY | Facility: CLINIC | Age: 89
End: 2024-11-19
Payer: MEDICARE

## 2024-11-27 ENCOUNTER — APPOINTMENT (OUTPATIENT)
Dept: AUDIOLOGY | Facility: CLINIC | Age: 89
End: 2024-11-27
Payer: MEDICARE

## 2024-11-27 ENCOUNTER — APPOINTMENT (OUTPATIENT)
Dept: OTOLARYNGOLOGY | Facility: CLINIC | Age: 89
End: 2024-11-27
Payer: MEDICARE

## 2024-12-04 ENCOUNTER — APPOINTMENT (OUTPATIENT)
Dept: PHYSICAL THERAPY | Facility: CLINIC | Age: 89
End: 2024-12-04
Payer: MEDICARE

## 2024-12-05 ENCOUNTER — APPOINTMENT (OUTPATIENT)
Dept: OTOLARYNGOLOGY | Facility: CLINIC | Age: 89
End: 2024-12-05
Payer: MEDICARE

## 2024-12-05 ENCOUNTER — APPOINTMENT (OUTPATIENT)
Dept: AUDIOLOGY | Facility: CLINIC | Age: 89
End: 2024-12-05
Payer: MEDICARE

## 2024-12-10 ENCOUNTER — APPOINTMENT (OUTPATIENT)
Dept: PHYSICAL THERAPY | Facility: CLINIC | Age: 89
End: 2024-12-10
Payer: MEDICARE

## 2024-12-10 ENCOUNTER — TREATMENT (OUTPATIENT)
Dept: PHYSICAL THERAPY | Facility: CLINIC | Age: 89
End: 2024-12-10
Payer: MEDICARE

## 2024-12-10 DIAGNOSIS — M54.16 LUMBAR RADICULITIS: ICD-10-CM

## 2024-12-10 DIAGNOSIS — M47.816 LUMBAR SPONDYLOSIS: ICD-10-CM

## 2024-12-10 PROCEDURE — 97110 THERAPEUTIC EXERCISES: CPT | Mod: GP,CQ

## 2024-12-10 NOTE — PROGRESS NOTES
"                                                                                                                         PHYSICAL THERAPY TREATMENT NOTE    Patient Name:  Topher Burroughs   Patient MRN: [id number]  Date: 12/10/2024  Time Calculation  Start Time: 0305  Stop Time: 0330  Time Calculation (min): 25 min      Problem List Items Addressed This Visit             ICD-10-CM    Lumbar radiculitis M54.16    Lumbar spondylosis M47.816       Insurance:  Visit number: 2   Insurance Type: Payor: T MEDICARE / Plan: [employer] / Product Type: *No Product type* /   Authorization or Plan of Care date Range:    General:  Reason for visit:  Lumbar spondylosis/radiculopathy R LE  Referred by: John Hawkins MD  Next MD appt: nothing is scheduled        Precautions:  None   Fall Risk: Moderate    Subjective:   Patient reports:  he is doing the exercises everyday and his back and foot feel fine.   Pain (0-10):0   Location: LB/ R foot  Type of pain:  none   HEP adherence / understanding: compliance with the instructed home exercises.    Assessment:     Arrived doing well. Progressed exercise program for ROM/ stabilization. Issued GTB for HEP.  Session shortened due to fire alarm in building , unable to print new exercises due to need to exit building.  Education: Reviewed home exercise program. Home exercise program instructed and issued. Answered questions about home exercise program.  Progress towards functional goals: co,pliant with HEP  Response to interventions: Tolerated treatment session well without any increase in pain.  Justification for continued skilled care: To address remaining functional, objective and subjective deficits to allow the patient to return to full independence with ADLs.    Plan:  Exercises targeting surrounding musculature to spine  and improve function.    Objective:       Treatment Performed: (\"NP\" = Not Performed)     Therapeutic Exercise:     25 minutes  Nu step L3 8'  Seated " lumbar flexion with green swiss ball 5 sec 10x   LTR 5 sec 10x   SKC 5 sec 10x   Supine TA 5 sec 10x   Hooklying hip adduction with ball squeeze 5 sec 2 x 10  HL hip abduction with GTB 2 x 10    Manual Therapy:       minutes      Neuromuscular Re-education:      minutes      Gait Training:            minutes      Aquatics:            minutes      Therapeutic Activity:      minutes      Gait Training:            minutes      Modalities:       Vasopneumatic Device       minutes  Electrical Stimulation          minutes  Ultrasound            minutes  Iontophoresis                     minutes  Cold Pack            minutes  Mechanical Traction           minutes    Self Care Home Management:    minutes    Canalith Reposition:          minutes     Education:          minutes    Other:       minutes      Evaluation Complexity: Low:   minutes; Moderate   minutes; Complex   minutes    Re-Evaluation:   minutes

## 2024-12-17 ENCOUNTER — APPOINTMENT (OUTPATIENT)
Dept: PHYSICAL THERAPY | Facility: CLINIC | Age: 89
End: 2024-12-17
Payer: MEDICARE

## 2024-12-17 ENCOUNTER — TREATMENT (OUTPATIENT)
Dept: PHYSICAL THERAPY | Facility: CLINIC | Age: 89
End: 2024-12-17
Payer: MEDICARE

## 2024-12-17 DIAGNOSIS — M54.16 LUMBAR RADICULITIS: ICD-10-CM

## 2024-12-17 DIAGNOSIS — M47.816 LUMBAR SPONDYLOSIS: ICD-10-CM

## 2024-12-17 PROCEDURE — 97110 THERAPEUTIC EXERCISES: CPT | Mod: GP | Performed by: PHYSICAL THERAPIST

## 2024-12-17 NOTE — PROGRESS NOTES
"PHYSICAL THERAPY TREATMENT NOTE    Patient Name:  Topher Burroughs   Patient MRN: [id number]  Date: 12/17/2024  Time Calculation  Start Time: 0200  Stop Time: 0240  Time Calculation (min): 40 min    Problem List Items Addressed This Visit             ICD-10-CM    Lumbar radiculitis M54.16    Lumbar spondylosis M47.816        Insurance:  Visit number #3  Insurance Type: Payor: T MEDICARE / Plan: [employer] / Product Type: *No Product type* / VISITS ARE MED NEC NO AUTH NEEDED   Authorization or Plan of Care date Range: n/a    General:  Reason for visit: Lumbar spondylosis/radiculopathy R LE    Referred by: John Hawkins MD   Next MD appt:  nothing is scheduled    Precautions:  none  Fall Risk: Moderate    Subjective:  Topher reports he feels like his condition is improving.  States pain is somewhat less, not having as much radiating pain    Pain (0-10): 2     HEP adherence / understanding: compliance with the instructed home exercises.    Assessment:  Patient was tolerant with program without provoking any symptoms    Education: Reviewed home exercise program. Answered questions about home exercise program.    Progress towards functional goals: Patient reports there has not been a significant change in functional abilities.    Response to interventions: Tolerated treatment session well without any increase in pain. Improved tolerance to strengthening progressions.    Justification for continued skilled care: To address remaining functional, objective and subjective deficits to allow the patient to return to full independence with ADLs. Progressive strengthening to stabilize the spine to improve function.    Plan:  Exercises targeting surrounding musculature to stabilize spine and improve function.    Objective:  No pain with repeated flexion    Treatment Performed: (\"NP\" = Not Performed) (\"NV\" = Next Visit)    HEP: Access Code: [email or web address]     Therapeutic Exercise: 40 minutes  Nu step L3 8'  Seated lumbar " "flexion with green swiss ball 10 sec 10x   LTR 5 sec 20x with SB  DKKC 5 sec 20x with SB  Supine TA 5 sec 10x -NP  Hooklying hip adduction with ball squeeze 5 sec 2 x 10  HL hip abduction with GTB 2 x 10  Hooklying marching G TB x 20  Standing hip ext/abd/flex G x10 ea bilateral  Side stepping G TB 5 laps at bar  Step ups 6\" F/L x 10 ea    Manual Therapy:   minutes      Neuromuscular Re-education:  minutes      Therapeutic Activity:  minutes      Gait Training:   minutes      Modalities:  Vasopneumatic Device  minutes  Electrical Stimulation  minutes  Ultrasound  minutes  Iontophoresis  minutes  Cold Pack  minutes    Evaluation Complexity: Low:   minutes; Moderate   minutes; Complex   minutes    Re-Evaluation:   minutes  "

## 2024-12-23 ENCOUNTER — APPOINTMENT (OUTPATIENT)
Dept: PHYSICAL THERAPY | Facility: CLINIC | Age: 89
End: 2024-12-23
Payer: MEDICARE

## 2024-12-26 ENCOUNTER — TREATMENT (OUTPATIENT)
Dept: PHYSICAL THERAPY | Facility: CLINIC | Age: 89
End: 2024-12-26
Payer: MEDICARE

## 2024-12-26 DIAGNOSIS — M47.816 LUMBAR SPONDYLOSIS: ICD-10-CM

## 2024-12-26 DIAGNOSIS — M54.16 LUMBAR RADICULITIS: ICD-10-CM

## 2024-12-26 PROCEDURE — 97110 THERAPEUTIC EXERCISES: CPT | Mod: GP,CQ

## 2024-12-26 NOTE — PROGRESS NOTES
"PHYSICAL THERAPY TREATMENT NOTE    Patient Name:  Topher Burroughs   Patient MRN: [id number]  Date: 12/26/2024  Time Calculation  Start Time: 0145  Stop Time: 0225  Time Calculation (min): 40 min    Problem List Items Addressed This Visit             ICD-10-CM    Lumbar radiculitis M54.16    Lumbar spondylosis M47.816        Insurance:  Visit number #4  Insurance Type: Payor: T MEDICARE / Plan: [employer] / Product Type: *No Product type* / VISITS ARE MED NEC NO AUTH NEEDED   Authorization or Plan of Care date Range: n/a    General:  Reason for visit: Lumbar spondylosis/radiculopathy R LE    Referred by: John Hawkins MD   Next MD appt:  nothing is scheduled    Precautions:  none  Fall Risk: Moderate    Subjective:  Topher reports he feels like his condition is improving.  Pain free upon arrival.    Pain (0-10): 0    HEP adherence / understanding: compliance with the instructed home exercises.    Assessment:  Patient was tolerant with program without provoking any symptoms, updated HEP with small SLR     Education: Reviewed home exercise program. Answered questions about home exercise program.    Progress towards functional goals: Patient reports there has not been a significant change in functional abilities.    Response to interventions: Tolerated treatment session well without any increase in pain. Improved tolerance to strengthening progressions.    Justification for continued skilled care: To address remaining functional, objective and subjective deficits to allow the patient to return to full independence with ADLs. Progressive strengthening to stabilize the spine to improve function.    Plan:  Exercises targeting surrounding musculature to stabilize spine and improve function.    Objective:  No pain with repeated flexion    Treatment Performed: (\"NP\" = Not Performed) (\"NV\" = Next Visit)    HEP: Access Code: [email or web address]     Therapeutic Exercise: 40 minutes  Nu step L3 8'  Seated lumbar flexion " "with green swiss ball 10 sec 10x   LTR 5 sec 20x with SB  DKKC 5 sec 20x with SB  Small SLR 5 sec 10x **  Supine TA 5 sec 10x -NP  Hooklying hip adduction with ball squeeze 5 sec 2 x 10  HL hip abduction with GTB 2 x 10  Hooklying marching G TB x 20  Standing hip ext/abd/flex G 2 x10 ea bilateral  Side stepping G TB 5 laps at bar  Airex 5 sec 5x each Leg for SLB  Airex HR 3 sec 10x   Step ups 6\" F/L x 10 ea    Manual Therapy:   minutes      Neuromuscular Re-education:  minutes      Therapeutic Activity:  minutes      Gait Training:   minutes      Modalities:  Vasopneumatic Device  minutes  Electrical Stimulation  minutes  Ultrasound  minutes  Iontophoresis  minutes  Cold Pack  minutes    Evaluation Complexity: Low:   minutes; Moderate   minutes; Complex   minutes    Re-Evaluation:   minutes  "

## 2024-12-30 ENCOUNTER — APPOINTMENT (OUTPATIENT)
Dept: PHYSICAL THERAPY | Facility: CLINIC | Age: 89
End: 2024-12-30
Payer: MEDICARE

## 2024-12-31 ENCOUNTER — TREATMENT (OUTPATIENT)
Dept: PHYSICAL THERAPY | Facility: CLINIC | Age: 89
End: 2024-12-31
Payer: MEDICARE

## 2024-12-31 DIAGNOSIS — M47.816 LUMBAR SPONDYLOSIS: ICD-10-CM

## 2024-12-31 DIAGNOSIS — M54.16 LUMBAR RADICULITIS: ICD-10-CM

## 2024-12-31 PROCEDURE — 97110 THERAPEUTIC EXERCISES: CPT | Mod: GP | Performed by: PHYSICAL THERAPIST

## 2024-12-31 NOTE — PROGRESS NOTES
"PHYSICAL THERAPY TREATMENT NOTE    Patient Name:  Topher Burroughs   Patient MRN: [id number]  Date: 12/31/2024  Time Calculation  Start Time: 0200  Stop Time: 0240  Time Calculation (min): 40 min    Problem List Items Addressed This Visit             ICD-10-CM    Lumbar radiculitis M54.16    Lumbar spondylosis M47.816     Insurance:  Visit number #5  Insurance Type: Payor: T MEDICARE / Plan: [employer] / Product Type: *No Product type* / VISITS ARE MED NEC NO AUTH NEEDED   Authorization or Plan of Care date Range: n/a    General:  Reason for visit: Lumbar spondylosis/radiculopathy R LE    Referred by: John Hawkins MD   Next MD appt:  nothing is scheduled    Precautions:  none  Fall Risk: Moderate    Subjective:  Topher reports he feels like his condition is improving. No recent falls or pain    Pain (0-10): 0    HEP adherence / understanding: compliance with the instructed home exercises.    Assessment:  Patient is improving with strengthening progression, challenged with balance and will continue to benefit from progression to prevent falls.     Education: Reviewed home exercise program. Answered questions about home exercise program.    Progress towards functional goals: Patient reports there has not been a significant change in functional abilities.    Response to interventions: Tolerated treatment session well without any increase in pain. Improved tolerance to strengthening progressions.    Justification for continued skilled care: To address remaining functional, objective and subjective deficits to allow the patient to return to full independence with ADLs. Progressive strengthening to stabilize the spine to improve function.    Plan:  Exercises targeting surrounding musculature to stabilize spine and improve function.    Objective:  No pain with repeated flexion    Treatment Performed: (\"NP\" = Not Performed) (\"NV\" = Next Visit)    HEP: Access Code: [email or web address]     Therapeutic Exercise: 40 " "minutes  Nu step L3 8'  Seated lumbar flexion with green swiss ball 10 sec 10x   LTR 5 sec 20x with SB  DKTC 5 sec 20x with SB  Supine TA 5 sec 10x -NP  Hooklying hip adduction with ball squeeze 5 sec 2 x 10  HL hip abduction with G TB 2 x 10  Hooklying marching G TB x 20  Standing hip ext/abd/flex G 2 x10 ea bilateral  Side stepping airex 5 laps  Airex 1 min marching and romberg  Step ups 6\" F/L x 10 ea    Manual Therapy:   minutes      Neuromuscular Re-education:  minutes      Therapeutic Activity:  minutes      Gait Training:   minutes      Modalities:  Vasopneumatic Device  minutes  Electrical Stimulation  minutes  Ultrasound  minutes  Iontophoresis  minutes  Cold Pack  minutes    Evaluation Complexity: Low:   minutes; Moderate   minutes; Complex   minutes    Re-Evaluation:   minutes  "

## 2025-01-02 ENCOUNTER — APPOINTMENT (OUTPATIENT)
Dept: PRIMARY CARE | Facility: CLINIC | Age: OVER 89
End: 2025-01-02
Payer: MEDICARE

## 2025-01-02 ENCOUNTER — LAB (OUTPATIENT)
Dept: LAB | Facility: LAB | Age: OVER 89
End: 2025-01-02
Payer: MEDICARE

## 2025-01-02 VITALS
SYSTOLIC BLOOD PRESSURE: 134 MMHG | DIASTOLIC BLOOD PRESSURE: 69 MMHG | OXYGEN SATURATION: 91 % | HEART RATE: 75 BPM | HEIGHT: 68 IN | WEIGHT: 175.6 LBS | RESPIRATION RATE: 10 BRPM | BODY MASS INDEX: 26.61 KG/M2

## 2025-01-02 DIAGNOSIS — Z00.00 ROUTINE GENERAL MEDICAL EXAMINATION AT A HEALTH CARE FACILITY: ICD-10-CM

## 2025-01-02 DIAGNOSIS — I10 PRIMARY HYPERTENSION: Primary | ICD-10-CM

## 2025-01-02 DIAGNOSIS — E78.5 DYSLIPIDEMIA: ICD-10-CM

## 2025-01-02 DIAGNOSIS — I10 PRIMARY HYPERTENSION: ICD-10-CM

## 2025-01-02 PROCEDURE — 83036 HEMOGLOBIN GLYCOSYLATED A1C: CPT

## 2025-01-02 PROCEDURE — 1157F ADVNC CARE PLAN IN RCRD: CPT | Performed by: INTERNAL MEDICINE

## 2025-01-02 PROCEDURE — 1159F MED LIST DOCD IN RCRD: CPT | Performed by: INTERNAL MEDICINE

## 2025-01-02 PROCEDURE — 80053 COMPREHEN METABOLIC PANEL: CPT

## 2025-01-02 PROCEDURE — 1036F TOBACCO NON-USER: CPT | Performed by: INTERNAL MEDICINE

## 2025-01-02 PROCEDURE — 84443 ASSAY THYROID STIM HORMONE: CPT

## 2025-01-02 PROCEDURE — 85027 COMPLETE CBC AUTOMATED: CPT

## 2025-01-02 PROCEDURE — 80061 LIPID PANEL: CPT

## 2025-01-02 PROCEDURE — 3075F SYST BP GE 130 - 139MM HG: CPT | Performed by: INTERNAL MEDICINE

## 2025-01-02 PROCEDURE — 84153 ASSAY OF PSA TOTAL: CPT

## 2025-01-02 PROCEDURE — 3078F DIAST BP <80 MM HG: CPT | Performed by: INTERNAL MEDICINE

## 2025-01-02 PROCEDURE — 99214 OFFICE O/P EST MOD 30 MIN: CPT | Performed by: INTERNAL MEDICINE

## 2025-01-02 RX ORDER — METOPROLOL SUCCINATE 25 MG/1
25 TABLET, EXTENDED RELEASE ORAL DAILY
Qty: 90 TABLET | Refills: 3 | Status: SHIPPED | OUTPATIENT
Start: 2025-01-02 | End: 2026-01-02

## 2025-01-02 RX ORDER — AMLODIPINE BESYLATE 5 MG/1
5 TABLET ORAL DAILY
Qty: 90 TABLET | Refills: 3 | Status: SHIPPED | OUTPATIENT
Start: 2025-01-02 | End: 2026-01-02

## 2025-01-02 ASSESSMENT — ENCOUNTER SYMPTOMS
BACK PAIN: 0
CONSTIPATION: 0
FREQUENCY: 0

## 2025-01-02 NOTE — PROGRESS NOTES
"Subjective   Patient ID: Topher Burroughs is a 89 y.o. male who presents for Establish Care.    Pt presents to get established from Dr. Piper office. Is here with his son.    PMH:  -HTN: Taking metoprolol and amlodipine.  -HLD: Stopped lovastatin in the last year. Pt not sure why it was stopped. Doesn't think he saw symptoms change when medication was stopped.  -BPH: On flomax.  -Hearing loss: Sees ENT. Recommended to start flonase 10/2024. Wears hearing aides.  -R lumbar radiculitis: Saw PM&R 10/2024 for this. Is in PT and an XR was done. Feels like pain is improving and is about resolved.  -Hemorrhoids: Hx of this. Advised in the past to increase fiber intake -> pt states he did increase this. Isn't straining for long periods of time. Has around 1 BM a day.        Review of Systems   Gastrointestinal:  Negative for constipation.   Genitourinary:  Negative for frequency and urgency.   Musculoskeletal:  Negative for back pain.       /69   Pulse 75   Resp 10   Ht 1.715 m (5' 7.5\")   Wt 79.7 kg (175 lb 9.6 oz)   SpO2 91%   BMI 27.10 kg/m²   Objective   Physical Exam  Constitutional:       General: He is not in acute distress.     Appearance: He is not ill-appearing, toxic-appearing or diaphoretic.   HENT:      Head: Normocephalic and atraumatic.   Eyes:      Conjunctiva/sclera: Conjunctivae normal.   Cardiovascular:      Rate and Rhythm: Normal rate and regular rhythm.      Heart sounds: No murmur heard.     No friction rub. No gallop.   Pulmonary:      Effort: Pulmonary effort is normal. No respiratory distress.      Breath sounds: No stridor. No wheezing, rhonchi or rales.   Abdominal:      General: Abdomen is flat. Bowel sounds are normal. There is no distension.      Palpations: Abdomen is soft.      Tenderness: There is no abdominal tenderness. There is no guarding.   Neurological:      Mental Status: He is alert.         Assessment/Plan   Problem List Items Addressed This Visit             ICD-10-CM "    Dyslipidemia E78.5     -Was taking lovastatin until it was stopped last year. Pt unsure why. Doesn't recall having side effects from medication, and didn't see any improvement after stopping it.  -Is ambivalent to restarting. Discussed risks/benefits of staying off vs resuming it. Will begin with repeating lipid levels before deciding. Pt agreeable.         Relevant Orders    Lipid panel    Hypertension - Primary I10     -Pt taking metoprolol and amlodipine. Repeat BP improved today. Refilled both medications.         Relevant Medications    amLODIPine (Norvasc) 5 mg tablet    metoprolol succinate XL (Toprol-XL) 25 mg 24 hr tablet    Other Relevant Orders    Comprehensive metabolic panel    CBC    Tsh With Reflex To Free T4 If Abnormal     Other Visit Diagnoses         Codes    Routine general medical examination at a health care facility     Z00.00    Relevant Orders    Hemoglobin A1c    PSA        -Labwork as above.  -Will see back in 4 months for BLANKA.         Jalyn Jj MD 01/02/25 2:10 PM

## 2025-01-02 NOTE — ASSESSMENT & PLAN NOTE
-Was taking lovastatin until it was stopped last year. Pt unsure why. Doesn't recall having side effects from medication, and didn't see any improvement after stopping it.  -Is ambivalent to restarting. Discussed risks/benefits of staying off vs resuming it. Will begin with repeating lipid levels before deciding. Pt agreeable.

## 2025-01-03 LAB
ALBUMIN SERPL BCP-MCNC: 4.5 G/DL (ref 3.4–5)
ALP SERPL-CCNC: 78 U/L (ref 33–136)
ALT SERPL W P-5'-P-CCNC: 18 U/L (ref 10–52)
ANION GAP SERPL CALC-SCNC: 12 MMOL/L (ref 10–20)
AST SERPL W P-5'-P-CCNC: 19 U/L (ref 9–39)
BILIRUB SERPL-MCNC: 0.7 MG/DL (ref 0–1.2)
BUN SERPL-MCNC: 19 MG/DL (ref 6–23)
CALCIUM SERPL-MCNC: 9.4 MG/DL (ref 8.6–10.6)
CHLORIDE SERPL-SCNC: 103 MMOL/L (ref 98–107)
CHOLEST SERPL-MCNC: 231 MG/DL (ref 0–199)
CHOLESTEROL/HDL RATIO: 3.7
CO2 SERPL-SCNC: 31 MMOL/L (ref 21–32)
CREAT SERPL-MCNC: 0.86 MG/DL (ref 0.5–1.3)
EGFRCR SERPLBLD CKD-EPI 2021: 82 ML/MIN/1.73M*2
ERYTHROCYTE [DISTWIDTH] IN BLOOD BY AUTOMATED COUNT: 12.9 % (ref 11.5–14.5)
EST. AVERAGE GLUCOSE BLD GHB EST-MCNC: 108 MG/DL
GLUCOSE SERPL-MCNC: 96 MG/DL (ref 74–99)
HBA1C MFR BLD: 5.4 %
HCT VFR BLD AUTO: 39.3 % (ref 41–52)
HDLC SERPL-MCNC: 61.8 MG/DL
HGB BLD-MCNC: 13 G/DL (ref 13.5–17.5)
LDLC SERPL CALC-MCNC: 145 MG/DL
MCH RBC QN AUTO: 32.5 PG (ref 26–34)
MCHC RBC AUTO-ENTMCNC: 33.1 G/DL (ref 32–36)
MCV RBC AUTO: 98 FL (ref 80–100)
NON HDL CHOLESTEROL: 169 MG/DL (ref 0–149)
NRBC BLD-RTO: 0 /100 WBCS (ref 0–0)
PLATELET # BLD AUTO: 110 X10*3/UL (ref 150–450)
POTASSIUM SERPL-SCNC: 4 MMOL/L (ref 3.5–5.3)
PROT SERPL-MCNC: 6.9 G/DL (ref 6.4–8.2)
PSA SERPL-MCNC: 0.12 NG/ML
RBC # BLD AUTO: 4 X10*6/UL (ref 4.5–5.9)
SODIUM SERPL-SCNC: 142 MMOL/L (ref 136–145)
TRIGL SERPL-MCNC: 120 MG/DL (ref 0–149)
TSH SERPL-ACNC: 0.97 MIU/L (ref 0.44–3.98)
VLDL: 24 MG/DL (ref 0–40)
WBC # BLD AUTO: 6.5 X10*3/UL (ref 4.4–11.3)

## 2025-01-06 ENCOUNTER — TELEPHONE (OUTPATIENT)
Dept: PRIMARY CARE | Facility: CLINIC | Age: OVER 89
End: 2025-01-06
Payer: MEDICARE

## 2025-01-06 DIAGNOSIS — E78.5 DYSLIPIDEMIA: Primary | ICD-10-CM

## 2025-01-06 RX ORDER — LOVASTATIN 10 MG/1
10 TABLET ORAL DAILY
Qty: 90 TABLET | Refills: 3 | Status: SHIPPED | OUTPATIENT
Start: 2025-01-06

## 2025-01-07 ENCOUNTER — TREATMENT (OUTPATIENT)
Dept: PHYSICAL THERAPY | Facility: CLINIC | Age: OVER 89
End: 2025-01-07
Payer: MEDICARE

## 2025-01-07 DIAGNOSIS — M54.16 LUMBAR RADICULITIS: ICD-10-CM

## 2025-01-07 DIAGNOSIS — M47.816 LUMBAR SPONDYLOSIS: ICD-10-CM

## 2025-01-07 PROCEDURE — 97110 THERAPEUTIC EXERCISES: CPT | Mod: GP,CQ

## 2025-01-07 NOTE — PROGRESS NOTES
"PHYSICAL THERAPY TREATMENT NOTE    Patient Name:  Topher Burroughs   Patient MRN: [id number]  Date: 1/7/2025  Time Calculation  Start Time: 0150  Stop Time: 0230  Time Calculation (min): 40 min    Problem List Items Addressed This Visit             ICD-10-CM    Lumbar radiculitis M54.16    Lumbar spondylosis M47.816     Insurance:  Visit number #6  Insurance Type: Payor: T MEDICARE / Plan: [employer] MEDICARE / Product Type: *No Product type* / VISITS ARE MED NEC NO AUTH NEEDED   Authorization or Plan of Care date Range: n/a    General:  Reason for visit: Lumbar spondylosis/radiculopathy R LE    Referred by: John Hawkins MD   Next MD appt:  nothing is scheduled    Precautions:  none  Fall Risk: Moderate    Subjective:  Topher reports he feels like his condition is improving. Compliant with HEP.    Pain (0-10): 0    HEP adherence / understanding: compliance with the instructed home exercises.    Assessment:  Patient is improving with strengthening progression, tolerated standing stabilization for good challenge.     Education: Reviewed home exercise program. Answered questions about home exercise program.Updated HEP and issued GTB with loop.    Progress towards functional goals: Improved strength.    Response to interventions: Tolerated treatment session well without any increase in pain. Improved tolerance to strengthening progressions.    Justification for continued skilled care: To address remaining functional, objective and subjective deficits to allow the patient to return to full independence with ADLs. Progressive strengthening to stabilize the spine to improve function.    Plan:  Exercises targeting surrounding musculature to stabilize spine and improve function.Recheck     Objective:  No pain with repeated flexion    Treatment Performed: (\"NP\" = Not Performed) (\"NV\" = Next Visit)    HEP: Access Code: [email or web address]     Therapeutic Exercise: 40 minutes  Nu step L3 8'  Seated lumbar flexion with green swiss " "ball 10 sec 10x   LTR 5 sec 20x with SB  DKTC 5 sec 20x with SB  Supine TA 5 sec 10x -NP  Bridge with hip adduction ball squeeze 5 sec x 10  DLS flex /ext GTB 15x   HL hip abduction with G TB 2 x 10  Hooklying marching G TB x 20  Standing hip ext/abd/flex G 2 x10 ea bilateral  Side stepping airex 5 laps  Airex 1 min marching and romberg  Step ups 6\" F/L x 10 ea    Manual Therapy:   minutes      Neuromuscular Re-education:  minutes      Therapeutic Activity:  minutes      Gait Training:   minutes      Modalities:  Vasopneumatic Device  minutes  Electrical Stimulation  minutes  Ultrasound  minutes  Iontophoresis  minutes  Cold Pack  minutes    Evaluation Complexity: Low:   minutes; Moderate   minutes; Complex   minutes    Re-Evaluation:   minutes  "

## 2025-01-14 ENCOUNTER — TREATMENT (OUTPATIENT)
Dept: PHYSICAL THERAPY | Facility: CLINIC | Age: OVER 89
End: 2025-01-14
Payer: MEDICARE

## 2025-01-14 DIAGNOSIS — M47.816 LUMBAR SPONDYLOSIS: ICD-10-CM

## 2025-01-14 DIAGNOSIS — M54.16 LUMBAR RADICULITIS: ICD-10-CM

## 2025-01-14 PROCEDURE — 97110 THERAPEUTIC EXERCISES: CPT | Mod: GP | Performed by: PHYSICAL THERAPIST

## 2025-01-14 NOTE — PROGRESS NOTES
PHYSICAL THERAPY TREATMENT NOTE    Patient Name:  Topher Burroughs   Patient MRN: [id number]  Date: 1/14/2025  Time Calculation  Start Time: 0155  Stop Time: 0235  Time Calculation (min): 40 min    Problem List Items Addressed This Visit             ICD-10-CM    Lumbar radiculitis M54.16    Lumbar spondylosis M47.816       Insurance:  Visit number #7  Insurance Type: Payor: T MEDICARE / Plan: [employer]ER MEDICARE / Product Type: *No Product type* / VISITS ARE MED NEC NO AUTH NEEDED   Authorization or Plan of Care date Range: n/a    General:  Reason for visit: Lumbar spondylosis/radiculopathy R LE    Referred by: John Hawkins MD   Next MD appt:  nothing is scheduled    Precautions:  none  Fall Risk: Moderate    Subjective:  Topher reports he feels like his condition is improving.  States overall his leg pain has decreased and continues with his exercises.   Pain (0-10): 0   HEP adherence / understanding: compliance with the instructed home exercises.    Assessment:  Patient overall has done very well with therapy and has less symptoms overall. He will continue with his exercises to maintain gains made with therapy.   Education: Reviewed home exercise program.  Progress towards functional goals: Improved ability to sleep thru the night. Improved gait quality. Improved walking distance. Improved ability to complete household activities. Reduced frequency of pain. Reduced intensity of pain.  Response to interventions: Improved joint mobility.  Improved independence with home exercises. Improved tolerance to strengthening progressions.  Justification for continued skilled care: none at this time    Plan:  Discharge from physical therapy.    Objective:  Goals:  -Patient to be Indep with HEP for self management of symptoms-MET     -Abolish LE radiating/radicular symptoms-MET     -Modified Oswestry: 0-19% impairment to indicate a significant improvement in overall function.-MET     -Patient will demonstrate correct  "posture with min to no cueing to allow for correct loading strategy.-MET     -Patient will demonstrate 5/5 hip strength to allow for correct gait and stair mechanics.-MET     -Patient will demo mild to no limitation AROM of the lumbar spine to allow for correct mechanics with functional mobility. -MET     -Patient will report standing 60 minutes  without pain to allow for return to work and ADLs without limitation. -not met, 30 min     -Patient will report sitting 60 minutes without pain to allow for return to work and ADLs without limitation.-MET     -Patient will demonstrate appropriate body mechanics for household and common activities to reduce incidence or future back pain exacerbations -MET    Treatment Performed: (\"NP\" = Not Performed) (\"NV\" = Next Visit)    HEP: Access Code: [email or web address]     Therapeutic Exercise: 40 minutes  Nu step L3 8'  Seated lumbar flexion with green swiss ball 10 sec 10x   LTR 5 sec 20x with SB  DKTC 5 sec 20x with SB  Supine TA 5 sec 10x -NP  Bridge with hip adduction ball squeeze 5 sec x 10  DLS flex /ext GTB 15x   HL hip abduction with G TB 2 x 10  Hooklying marching G TB x 20  Standing hip ext/abd/flex G 2 x10 ea bilateral  Side stepping airex 5 laps  Airex 1 min marching and romberg  Step ups 6\" F/L x 10 ea    Manual Therapy:   minutes      Neuromuscular Re-education:  minutes      Therapeutic Activity:  minutes      Gait Training:   minutes      Modalities:  Vasopneumatic Device  minutes  Electrical Stimulation  minutes  Ultrasound  minutes  Iontophoresis  minutes  Cold Pack  minutes    Evaluation Complexity: Low:   minutes; Moderate   minutes; Complex   minutes    Re-Evaluation:   minutes  "

## 2025-02-18 ENCOUNTER — APPOINTMENT (OUTPATIENT)
Dept: PRIMARY CARE | Facility: CLINIC | Age: OVER 89
End: 2025-02-18
Payer: MEDICARE

## 2025-04-30 DIAGNOSIS — I10 PRIMARY HYPERTENSION: ICD-10-CM

## 2025-05-01 RX ORDER — METOPROLOL SUCCINATE 25 MG/1
25 TABLET, EXTENDED RELEASE ORAL DAILY
Qty: 90 TABLET | Refills: 3 | Status: SHIPPED | OUTPATIENT
Start: 2025-05-01 | End: 2026-05-01

## 2025-05-20 ENCOUNTER — APPOINTMENT (OUTPATIENT)
Dept: PRIMARY CARE | Facility: CLINIC | Age: OVER 89
End: 2025-05-20
Payer: MEDICARE

## 2025-05-20 VITALS
WEIGHT: 174.8 LBS | BODY MASS INDEX: 26.49 KG/M2 | HEIGHT: 68 IN | HEART RATE: 76 BPM | OXYGEN SATURATION: 95 % | SYSTOLIC BLOOD PRESSURE: 146 MMHG | RESPIRATION RATE: 18 BRPM | DIASTOLIC BLOOD PRESSURE: 68 MMHG

## 2025-05-20 DIAGNOSIS — E78.5 DYSLIPIDEMIA: ICD-10-CM

## 2025-05-20 DIAGNOSIS — Z71.89 GOALS OF CARE, COUNSELING/DISCUSSION: ICD-10-CM

## 2025-05-20 DIAGNOSIS — I10 PRIMARY HYPERTENSION: ICD-10-CM

## 2025-05-20 DIAGNOSIS — Z00.00 MEDICARE ANNUAL WELLNESS VISIT, SUBSEQUENT: Primary | ICD-10-CM

## 2025-05-20 PROBLEM — F03.90 DEMENTIA: Status: RESOLVED | Noted: 2023-03-23 | Resolved: 2025-05-20

## 2025-05-20 PROCEDURE — 1170F FXNL STATUS ASSESSED: CPT | Performed by: INTERNAL MEDICINE

## 2025-05-20 PROCEDURE — 1158F ADVNC CARE PLAN TLK DOCD: CPT | Performed by: INTERNAL MEDICINE

## 2025-05-20 PROCEDURE — 1123F ACP DISCUSS/DSCN MKR DOCD: CPT | Performed by: INTERNAL MEDICINE

## 2025-05-20 PROCEDURE — 1159F MED LIST DOCD IN RCRD: CPT | Performed by: INTERNAL MEDICINE

## 2025-05-20 PROCEDURE — 3078F DIAST BP <80 MM HG: CPT | Performed by: INTERNAL MEDICINE

## 2025-05-20 PROCEDURE — 1036F TOBACCO NON-USER: CPT | Performed by: INTERNAL MEDICINE

## 2025-05-20 PROCEDURE — G0439 PPPS, SUBSEQ VISIT: HCPCS | Performed by: INTERNAL MEDICINE

## 2025-05-20 PROCEDURE — 99397 PER PM REEVAL EST PAT 65+ YR: CPT | Performed by: INTERNAL MEDICINE

## 2025-05-20 PROCEDURE — 3077F SYST BP >= 140 MM HG: CPT | Performed by: INTERNAL MEDICINE

## 2025-05-20 ASSESSMENT — ENCOUNTER SYMPTOMS
FATIGUE: 1
SLEEP DISTURBANCE: 0
OCCASIONAL FEELINGS OF UNSTEADINESS: 1
DEPRESSION: 0
CONSTIPATION: 0
LOSS OF SENSATION IN FEET: 0
SHORTNESS OF BREATH: 0
BLOOD IN STOOL: 0
HEADACHES: 0
DIZZINESS: 0

## 2025-05-20 ASSESSMENT — ACTIVITIES OF DAILY LIVING (ADL)
MANAGING_FINANCES: INDEPENDENT
BATHING: INDEPENDENT
DRESSING: INDEPENDENT
TAKING_MEDICATION: INDEPENDENT
GROCERY_SHOPPING: INDEPENDENT
DOING_HOUSEWORK: INDEPENDENT

## 2025-05-20 ASSESSMENT — PATIENT HEALTH QUESTIONNAIRE - PHQ9
1. LITTLE INTEREST OR PLEASURE IN DOING THINGS: NOT AT ALL
2. FEELING DOWN, DEPRESSED OR HOPELESS: NOT AT ALL
SUM OF ALL RESPONSES TO PHQ9 QUESTIONS 1 AND 2: 0

## 2025-05-20 NOTE — PROGRESS NOTES
"Subjective   Reason for Visit: Topher Burroughs is an 89 y.o. male here for a Medicare Wellness visit.     Past Medical, Surgical, and Family History reviewed and updated in chart.    Reviewed all medications by prescribing practitioner or clinical pharmacist (such as prescriptions, OTCs, herbal therapies and supplements) and documented in the medical record.    Pt here for MAW.    States he overall is doing well.    Will do floor exercises and go on the treadmill nearly daily. More recently stopped d/t being 'lazy'. Has been napping in the afternoons which pt feels helps.    Does note that he has been dealing with hemorrhoids which are not painful or causing bleeding.        Patient Care Team:  Jalyn Jj MD as PCP - General (Internal Medicine)  Chau Piper DO as PCP - [employer] PCP  Johny Huffman MD PhD as Surgeon (General Surgery)     Pt is not considered to be at high risk of opioid abuse after reviewing chart.    Review of Systems   Constitutional:  Positive for fatigue.   Respiratory:  Negative for shortness of breath.    Cardiovascular:  Negative for chest pain.   Gastrointestinal:  Negative for blood in stool and constipation.   Neurological:  Negative for dizziness and headaches.   Psychiatric/Behavioral:  Negative for sleep disturbance.        Objective   Vitals:  /68 (BP Location: Right arm, Patient Position: Sitting) Comment: manual  Pulse 76   Resp 18   Ht 1.717 m (5' 7.6\")   Wt 79.3 kg (174 lb 12.8 oz)   SpO2 95%   BMI 26.89 kg/m²       Physical Exam  Constitutional:       General: He is not in acute distress.     Appearance: He is not ill-appearing, toxic-appearing or diaphoretic.   HENT:      Head: Normocephalic and atraumatic.   Eyes:      Conjunctiva/sclera: Conjunctivae normal.   Cardiovascular:      Rate and Rhythm: Normal rate and regular rhythm.      Heart sounds: No murmur heard.     No friction rub. No gallop.   Pulmonary:      Effort: Pulmonary effort is " normal. No respiratory distress.      Breath sounds: No stridor. No wheezing, rhonchi or rales.   Abdominal:      General: Abdomen is flat. Bowel sounds are normal. There is no distension.      Palpations: Abdomen is soft.      Tenderness: There is no abdominal tenderness. There is no guarding.   Neurological:      Mental Status: He is alert.         Assessment/Plan   Assessment & Plan  Medicare annual wellness visit, subsequent         Goals of care, counseling/discussion         Dyslipidemia  -Restarted lovastatin after last lipid panel. Pt tolerating without issue. Pt fasting today; will recheck lvls.  Orders:    Lipid panel; Future    Primary hypertension  -Mildly higher today. Pt taking amlodipine 5mg daily and metoprolol 25mg daily. Will observe for now and see if this changes by next visit. Want to be cautious to not overly drop blood pressure.            Advance Directives Discussion  Advanced Care Planning (including a Living Will, Healthcare POA, as well as specific end of life choices and/or directives), was discussed with the patient and/or surrogate, voluntarily, and details of that discussion documented in the Problem List (under Advanced Directives Discussion) of the medical record.  Pt states that the living will/HCPOA paperwork we have is out of date. His daughter Ailin is his HCPOA so her contact info was added to the chart. Pt's son plans to bring records in when able. Pt confirms he wants to be DNR. Pt's son states that pt's latest living will actually reflects this as well. Paperwork signed today confirming this.   (~16 min spent discussing above)

## 2025-05-20 NOTE — ASSESSMENT & PLAN NOTE
-Restarted lovastatin after last lipid panel. Pt tolerating without issue. Pt fasting today; will recheck lvls.  Orders:    Lipid panel; Future

## 2025-05-20 NOTE — ASSESSMENT & PLAN NOTE
-Mildly higher today. Pt taking amlodipine 5mg daily and metoprolol 25mg daily. Will observe for now and see if this changes by next visit. Want to be cautious to not overly drop blood pressure.

## 2025-05-21 LAB
CHOLEST SERPL-MCNC: 173 MG/DL
CHOLEST/HDLC SERPL: 2.7 (CALC)
HDLC SERPL-MCNC: 64 MG/DL
LDLC SERPL CALC-MCNC: 89 MG/DL (CALC)
NONHDLC SERPL-MCNC: 109 MG/DL (CALC)
TRIGL SERPL-MCNC: 105 MG/DL

## 2026-01-05 ENCOUNTER — APPOINTMENT (OUTPATIENT)
Dept: PRIMARY CARE | Facility: CLINIC | Age: OVER 89
End: 2026-01-05
Payer: MEDICARE